# Patient Record
Sex: FEMALE | Race: WHITE | Employment: FULL TIME | URBAN - METROPOLITAN AREA
[De-identification: names, ages, dates, MRNs, and addresses within clinical notes are randomized per-mention and may not be internally consistent; named-entity substitution may affect disease eponyms.]

---

## 2019-05-15 ENCOUNTER — HOSPITAL ENCOUNTER (OUTPATIENT)
Dept: ULTRASOUND IMAGING | Age: 69
Discharge: HOME | End: 2019-05-15
Attending: NURSE PRACTITIONER
Payer: MEDICARE

## 2019-05-15 DIAGNOSIS — M79.89 SWELLING OF LIMB: ICD-10-CM

## 2019-05-15 DIAGNOSIS — M79.605 LEFT LEG PAIN: ICD-10-CM

## 2019-05-15 PROCEDURE — 93971 EXTREMITY STUDY: CPT

## 2021-01-23 ENCOUNTER — NURSE TRIAGE (OUTPATIENT)
Dept: OTHER | Facility: CLINIC | Age: 71
End: 2021-01-23

## 2021-01-23 NOTE — TELEPHONE ENCOUNTER
Call received on [address]. Brief description of triage: covid exposure    Triage indicates for patient to call pcp when office opens. Care advice provided. Recommended using local department of health website for testing locations and up to date information. Caller verbalizes understanding, denies any other questions or concerns; instructed to call back for any new or worsening symptoms. Reason for Disposition   [1] CLOSE CONTACT COVID-19 EXPOSURE within last 14 days AND [2] NO symptoms    Answer Assessment - Initial Assessment Questions  1. COVID-19 CLOSE CONTACT: \"Who is the person with the confirmed or suspected COVID-19 infection that you were exposed to? \"      Son in law on Wednesday    2. PLACE of CONTACT: \"Where were you when you were exposed to COVID-19? \" (e.g., home, school, medical waiting room; which city?)      Home    3. TYPE of CONTACT: \"How much contact was there? \" (e.g., sitting next to, live in same house, work in same office, same building)       Sat together    4. DURATION of CONTACT: \"How long were you in contact with the COVID-19 patient? \" (e.g., a few seconds, passed by person, a few minutes, 15 minutes or longer, live with the patient)       More than 15 minutes    5. MASK: \"Were you wearing a mask? \" \"Was the other person wearing a mask? \" Note: wearing a mask reduces the risk of an otherwise close contact. No masks    6. DATE of CONTACT: \"When did you have contact with a COVID-19 patient? \" (e.g., how many days ago)      Wednesday of last week    7. COMMUNITY SPREAD: \"Are there lots of cases of COVID-19 (community spread) where you live? \" (See public health department website, if unsure)        NA    8. SYMPTOMS: \"Do you have any symptoms? \" (e.g., fever, cough, breathing difficulty, loss of taste or smell)      None    9. PREGNANCY OR POSTPARTUM: \"Is there any chance you are pregnant? \" \"When was your last menstrual period? \" \"Did you deliver in the last 2 weeks? \" NA    10. HIGH RISK: \"Do you have any heart or lung problems? Do you have a weak immune system? \" (e.g., heart failure, COPD, asthma, HIV positive, chemotherapy, renal failure, diabetes mellitus, sickle cell anemia, obesity)        None    11. TRAVEL: \"Have you traveled out of the country recently? \" If so, \"When and where? \" Also ask about out-of-state travel, since the [facility] has identified some high-risk cities for community spread in the  Note: Travel becomes less relevant if there is widespread community transmission where the patient lives.         NA    Protocols used: CORONAVIRUS (COVID-19) EXPOSURE-ADULT-AH

## 2022-04-18 RX ORDER — ROSUVASTATIN CALCIUM 20 MG/1
20 TABLET, COATED ORAL
COMMUNITY

## 2022-04-18 RX ORDER — ZINC GLUCONATE 50 MG
TABLET ORAL
COMMUNITY

## 2022-04-18 RX ORDER — METFORMIN HYDROCHLORIDE 1000 MG/1
1000 TABLET ORAL 2 TIMES DAILY WITH MEALS
COMMUNITY

## 2022-04-18 RX ORDER — ASPIRIN 81 MG/1
81 TABLET ORAL DAILY
COMMUNITY

## 2022-04-18 RX ORDER — FAMOTIDINE 40 MG/1
40 TABLET, FILM COATED ORAL DAILY
COMMUNITY

## 2022-04-18 RX ORDER — CHOLECALCIFEROL (VITAMIN D3) 50 MCG
CAPSULE ORAL
COMMUNITY

## 2022-04-18 RX ORDER — AMLODIPINE BESYLATE 10 MG/1
10 TABLET ORAL DAILY
COMMUNITY

## 2022-04-18 RX ORDER — LOSARTAN POTASSIUM 100 MG/1
100 TABLET ORAL DAILY
COMMUNITY

## 2022-04-20 ENCOUNTER — HOSPITAL ENCOUNTER (OUTPATIENT)
Age: 72
Setting detail: OUTPATIENT SURGERY
Discharge: HOME | End: 2022-04-20
Attending: SPECIALIST | Admitting: SPECIALIST
Payer: MEDICARE

## 2022-04-20 ENCOUNTER — ANESTHESIA (OUTPATIENT)
Dept: ENDOSCOPY | Age: 72
End: 2022-04-20
Payer: MEDICARE

## 2022-04-20 ENCOUNTER — ANESTHESIA EVENT (OUTPATIENT)
Dept: ENDOSCOPY | Age: 72
End: 2022-04-20
Payer: MEDICARE

## 2022-04-20 VITALS
HEIGHT: 69 IN | WEIGHT: 172.9 LBS | SYSTOLIC BLOOD PRESSURE: 129 MMHG | RESPIRATION RATE: 16 BRPM | OXYGEN SATURATION: 100 % | HEART RATE: 60 BPM | TEMPERATURE: 97.2 F | BODY MASS INDEX: 25.61 KG/M2 | DIASTOLIC BLOOD PRESSURE: 48 MMHG

## 2022-04-20 PROCEDURE — 74011250636 HC RX REV CODE- 250/636: Performed by: NURSE ANESTHETIST, CERTIFIED REGISTERED

## 2022-04-20 PROCEDURE — 74011250636 HC RX REV CODE- 250/636: Performed by: SPECIALIST

## 2022-04-20 PROCEDURE — 76040000019: Performed by: SPECIALIST

## 2022-04-20 PROCEDURE — 2709999900 HC NON-CHARGEABLE SUPPLY: Performed by: SPECIALIST

## 2022-04-20 PROCEDURE — 74011000250 HC RX REV CODE- 250: Performed by: NURSE ANESTHETIST, CERTIFIED REGISTERED

## 2022-04-20 PROCEDURE — 76060000031 HC ANESTHESIA FIRST 0.5 HR: Performed by: SPECIALIST

## 2022-04-20 RX ORDER — PROPOFOL 10 MG/ML
INJECTION, EMULSION INTRAVENOUS AS NEEDED
Status: DISCONTINUED | OUTPATIENT
Start: 2022-04-20 | End: 2022-04-20 | Stop reason: HOSPADM

## 2022-04-20 RX ORDER — SODIUM CHLORIDE 0.9 % (FLUSH) 0.9 %
5-40 SYRINGE (ML) INJECTION EVERY 8 HOURS
Status: DISCONTINUED | OUTPATIENT
Start: 2022-04-20 | End: 2022-04-20 | Stop reason: HOSPADM

## 2022-04-20 RX ORDER — DEXTROMETHORPHAN/PSEUDOEPHED 2.5-7.5/.8
1.2 DROPS ORAL
Status: DISCONTINUED | OUTPATIENT
Start: 2022-04-20 | End: 2022-04-20 | Stop reason: HOSPADM

## 2022-04-20 RX ORDER — LIDOCAINE HYDROCHLORIDE 20 MG/ML
INJECTION, SOLUTION EPIDURAL; INFILTRATION; INTRACAUDAL; PERINEURAL AS NEEDED
Status: DISCONTINUED | OUTPATIENT
Start: 2022-04-20 | End: 2022-04-20 | Stop reason: HOSPADM

## 2022-04-20 RX ORDER — SODIUM CHLORIDE 9 MG/ML
50 INJECTION, SOLUTION INTRAVENOUS CONTINUOUS
Status: DISCONTINUED | OUTPATIENT
Start: 2022-04-20 | End: 2022-04-20 | Stop reason: HOSPADM

## 2022-04-20 RX ORDER — SODIUM CHLORIDE 0.9 % (FLUSH) 0.9 %
5-40 SYRINGE (ML) INJECTION AS NEEDED
Status: DISCONTINUED | OUTPATIENT
Start: 2022-04-20 | End: 2022-04-20 | Stop reason: HOSPADM

## 2022-04-20 RX ADMIN — LIDOCAINE HYDROCHLORIDE 40 MG: 20 INJECTION, SOLUTION EPIDURAL; INFILTRATION; INTRACAUDAL; PERINEURAL at 08:30

## 2022-04-20 RX ADMIN — SODIUM CHLORIDE 50 ML/HR: 9 INJECTION, SOLUTION INTRAVENOUS at 07:55

## 2022-04-20 RX ADMIN — PROPOFOL 50 MG: 10 INJECTION, EMULSION INTRAVENOUS at 08:30

## 2022-04-20 RX ADMIN — PROPOFOL 20 MG: 10 INJECTION, EMULSION INTRAVENOUS at 08:38

## 2022-04-20 RX ADMIN — PROPOFOL 50 MG: 10 INJECTION, EMULSION INTRAVENOUS at 08:33

## 2022-04-20 NOTE — DISCHARGE INSTRUCTIONS
Nadia Pato  [id number]  1950    COLON DISCHARGE INSTRUCTIONS  Discomfort:  Redness at IV site- apply warm compress to area; if redness or soreness persist- contact your physician  There may be a slight amount of blood passed from the rectum  Gaseous discomfort- walking, belching will help relieve any discomfort  You may not operate a vehicle for 12 hours  You may not engage in an occupation involving machinery or appliances for rest of today  You may not drink alcoholic beverages for at least 12 hours  Avoid making any critical decisions for at least 24 hour  DIET:   Regular diet. - however -  remember your colon is empty and a heavy meal will produce gas. Avoid these foods:  vegetables, fried / greasy foods, carbonated drinks for today. MEDICATIONS:        Regarding Aspirin or Nonsteroidal medications, please see below. ACTIVITY:  You may resume your normal daily activities it is recommended that you spend the remainder of the day resting -  avoid any strenuous activity. CALL M.D. ANY SIGN OF:  Increasing pain, nausea, vomiting  Abdominal distension (swelling)  New increased bleeding (oral or rectal)  Fever (chills)  Pain in chest area  Bloody discharge from nose or mouth  Shortness of breath     Tylenol as needed for pain.       Follow-up Instructions:   Call Dr. Vicky Talbot for results of procedure at telephone #  [phone]              Repeat Colonoscopy in 10 years

## 2022-04-20 NOTE — ANESTHESIA POSTPROCEDURE EVALUATION
Procedure(s):  COLONOSCOPY. MAC    Anesthesia Post Evaluation      Multimodal analgesia: multimodal analgesia used between 6 hours prior to anesthesia start to PACU discharge  Patient location during evaluation: PACU  Patient participation: complete - patient participated  Level of consciousness: sleepy but conscious  Pain score: 1  Pain management: adequate  Airway patency: patent  Anesthetic complications: no  Cardiovascular status: acceptable  Respiratory status: acceptable  Hydration status: acceptable  Comments: +Post-Anesthesia Evaluation and Assessment    Patient: Troy Vargas MRN: [id number]  SSN: [id number]   YOB: 1950  Age: 67 y.o. Sex: female      Cardiovascular Function/Vital Signs    BP (!) 94/43   Pulse 60   Temp 36.3 °C (97.4 °F)   Resp 15   Ht 5' 9\" (1.753 m)   Wt 78.4 kg (172 lb 14.4 oz)   SpO2 100%   BMI 25.53 kg/m²     Patient is status post Procedure(s):  COLONOSCOPY. Nausea/Vomiting: Controlled. Postoperative hydration reviewed and adequate. Pain:  Pain Scale 1: Numeric (0 - 10) (04/20/22 0847)  Pain Intensity 1: 0 (04/20/22 0847)   Managed. Neurological Status: At baseline. Mental Status and Level of Consciousness: Arousable. Pulmonary Status:   O2 Device: None (Room air) (04/20/22 0847)   Adequate oxygenation and airway patent. Complications related to anesthesia: None    Post-anesthesia assessment completed. No concerns. Signed By: Michal Ganser, MD    4/20/2022  Post anesthesia nausea and vomiting:  controlled  Final Post Anesthesia Temperature Assessment:  Normothermia (36.0-37.5 degrees C)      INITIAL Post-op Vital signs: No vitals data found for the desired time range.

## 2022-04-20 NOTE — ROUTINE PROCESS
[address]  1950  [id number]    Situation:  Verbal report received from: Leo De Anda  Procedure: Procedure(s):  COLONOSCOPY    Background:    Preoperative diagnosis: Screening for colon cancer [Z12.11]  Postoperative diagnosis: Internal Hemorrhoids    :  Dr. Guillermina Jones  Assistant(s): Endoscopy Technician-1: Melchor Campbell  Endoscopy RN-1: Joel Hyatt    Specimens: * No specimens in log *  H. Pylori  no    Assessment:  Intra-procedure medications       Anesthesia gave intra-procedure sedation and medications, see anesthesia flow sheet yes    Intravenous fluids: NS@ KVO     Vital signs stable yes    Abdominal assessment: round and soft yes    Recommendation:

## 2022-04-20 NOTE — ANESTHESIA PREPROCEDURE EVALUATION
Relevant Problems   No relevant active problems       Anesthetic History   No history of anesthetic complications            Review of Systems / Medical History  Patient summary reviewed, nursing notes reviewed and pertinent labs reviewed    Pulmonary  Within defined limits                 Neuro/Psych   Within defined limits           Cardiovascular    Hypertension              Exercise tolerance: >4 METS     GI/Hepatic/Renal     GERD           Endo/Other    Diabetes: type 2         Other Findings              Physical Exam    Airway  Mallampati: II  TM Distance: 4 - 6 cm  Neck ROM: normal range of motion   Mouth opening: Normal     Cardiovascular  Regular rate and rhythm,  S1 and S2 normal,  no murmur, click, rub, or gallop  Rhythm: regular  Rate: normal         Dental  No notable dental hx       Pulmonary  Breath sounds clear to auscultation               Abdominal  GI exam deferred       Other Findings            Anesthetic Plan    ASA: 2  Anesthesia type: MAC          Induction: Intravenous  Anesthetic plan and risks discussed with: Patient

## 2022-04-20 NOTE — PROCEDURES
Colonoscopy Procedure Note    Indications:   Screening colonoscopy    Referring Physician: Mai Austin MD  Anesthesia/Sedation: MAC anesthesia Propofol  Endoscopist:  Dr. Vincent Jimenez    Procedure in Detail:  Informed consent was obtained for the procedure, including sedation. Risks of perforation, hemorrhage, adverse drug reaction, and aspiration were discussed. The patient was placed in the left lateral decubitus position. Based on the pre-procedure assessment, including review of the patient's medical history, medications, allergies, and review of systems, she had been deemed to be an appropriate candidate for moderate sedation; she was therefore sedated with the medications listed above. The patient was monitored continuously with ECG tracing, pulse oximetry, blood pressure monitoring, and direct observations. A rectal examination was performed. The [id number] was inserted into the rectum and advanced under direct vision to the terminal ileum. The quality of the colonic preparation was adequate. A careful inspection was made as the colonoscope was withdrawn, including a retroflexed view of the rectum; findings and interventions are described below. Appropriate photodocumentation was obtained. Findings:   1. Scope advanced to the cecum and terminal ileum. 2.  Preparation was adequate. 3.  No polyps seen. 4.  Small internal hemorrhoids. Therapies:  none    Specimen:  none     Complications: None were encountered during the procedure. EBL: < 10 ml.     Recommendations:   -Repeat Colonoscopy in 10 years    Signed By: Jacinta Hammer MD                        April 20, 2022

## 2022-04-20 NOTE — H&P
Gastroenterology Outpatient History and Physical    Patient: Derick Becerra    Physician: Osbaldo Emery MD    Vital Signs: Blood pressure (!) 132/54, pulse 81, temperature 97.4 °F (36.3 °C), resp. rate 18, height 5' 9\" (1.753 m), weight 78.4 kg (172 lb 14.4 oz), SpO2 100 %. Allergies: Allergies   Allergen Reactions    Lisinopril Cough    Tetracyclines Other (comments)     Unknown-per mother as child       Chief Complaint: CRC screening    History of Present Illness: 66 yo WF for screening colonoscopy    Justification for Procedure: above    History:  Past Medical History:   Diagnosis Date    Diabetes ([address]     GERD (gastroesophageal reflux disease)     Hypertension     Leaky heart valve     [facility]      Past Surgical History:   Procedure Laterality Date    HX CATARACT REMOVAL      HX TUBAL LIGATION        Social History     Socioeconomic History    Marital status:    Tobacco Use    Smoking status: Never Smoker    Smokeless tobacco: Never Used   Vaping Use    Vaping Use: Never used   Substance and Sexual Activity    Alcohol use: Yes     Alcohol/week: 2.0 standard drinks     Types: 2 Glasses of wine per week    Drug use: Never    Sexual activity: Yes     Partners: Male      Family History   Problem Relation Age of Onset    Cancer Mother     Cancer Father        Medications:   Prior to Admission medications    Medication Sig Start Date End Date Taking? Authorizing Provider   metFORMIN (GLUCOPHAGE) 1,000 mg tablet Take 1,000 mg by mouth two (2) times daily (with meals). Yes Provider, Historical   amLODIPine (NORVASC) 10 mg tablet Take 10 mg by mouth daily. Yes Provider, Historical   rosuvastatin (CRESTOR) 20 mg tablet Take 20 mg by mouth nightly. Yes Provider, Historical   losartan (COZAAR) 100 mg tablet Take 100 mg by mouth daily. Yes Provider, Historical   famotidine (PEPCID) 40 mg tablet Take 40 mg by mouth daily.    Yes Provider, Historical B.animalis,bifid,infantis,long (Probiotic 4X) 10-15 mg TbEC Take  by mouth. Yes Provider, Historical   Magnesium Oxide 500 mg cap Take  by mouth. Yes Provider, Historical   aspirin delayed-release 81 mg tablet Take 81 mg by mouth daily. Yes Provider, Historical       Physical Exam:   General: alert, no distress   HEENT: Head: Normocephalic, no lesions, without obvious abnormality.    Heart: regular rate and rhythm, S1, S2 normal, no murmur, click, rub or gallop   Lungs: chest clear, no wheezing, rales, normal symmetric air entry   Abdominal: soft, NT/ND + BS   Neurological: Grossly normal   Extremities: extremities normal, atraumatic, no cyanosis or edema     Findings/Diagnosis: CRC Screening    Plan of Care/Planned Procedure: Colonoscopy

## 2022-10-11 ENCOUNTER — APPOINTMENT (OUTPATIENT)
Dept: GENERAL RADIOLOGY | Age: 72
End: 2022-10-11
Attending: EMERGENCY MEDICINE
Payer: MEDICARE

## 2022-10-11 ENCOUNTER — HOSPITAL ENCOUNTER (EMERGENCY)
Age: 72
Discharge: HOME | End: 2022-10-11
Attending: EMERGENCY MEDICINE
Payer: MEDICARE

## 2022-10-11 VITALS
HEART RATE: 83 BPM | HEIGHT: 70 IN | OXYGEN SATURATION: 99 % | TEMPERATURE: 98.1 F | WEIGHT: 193.56 LBS | RESPIRATION RATE: 16 BRPM | SYSTOLIC BLOOD PRESSURE: 125 MMHG | DIASTOLIC BLOOD PRESSURE: 65 MMHG | BODY MASS INDEX: 27.71 KG/M2

## 2022-10-11 DIAGNOSIS — R07.9 CHEST PAIN, UNSPECIFIED TYPE: Primary | ICD-10-CM

## 2022-10-11 LAB
ALBUMIN SERPL-MCNC: 4.3 G/DL (ref 3.5–5)
ALBUMIN/GLOB SERPL: 1.3 {RATIO} (ref 1.1–2.2)
ALP SERPL-CCNC: 62 U/L (ref 45–117)
ALT SERPL-CCNC: 32 U/L (ref 12–78)
ANION GAP SERPL CALC-SCNC: 5 MMOL/L (ref 5–15)
AST SERPL-CCNC: 24 U/L (ref 15–37)
ATRIAL RATE: 77 BPM
BASOPHILS # BLD: 0 K/UL (ref 0–0.1)
BASOPHILS NFR BLD: 1 % (ref 0–1)
BILIRUB SERPL-MCNC: 0.6 MG/DL (ref 0.2–1)
BNP SERPL-MCNC: 88 PG/ML
BUN SERPL-MCNC: 18 MG/DL (ref 6–20)
BUN/CREAT SERPL: 19 (ref 12–20)
CALCIUM SERPL-MCNC: 9.5 MG/DL (ref 8.5–10.1)
CALCULATED P AXIS, ECG09: 70 DEGREES
CALCULATED R AXIS, ECG10: -10 DEGREES
CALCULATED T AXIS, ECG11: 34 DEGREES
CHLORIDE SERPL-SCNC: 105 MMOL/L (ref 97–108)
CO2 SERPL-SCNC: 27 MMOL/L (ref 21–32)
CREAT SERPL-MCNC: 0.97 MG/DL (ref 0.55–1.02)
DIAGNOSIS, 93000: NORMAL
DIFFERENTIAL METHOD BLD: NORMAL
EOSINOPHIL # BLD: 0.1 K/UL (ref 0–0.4)
EOSINOPHIL NFR BLD: 2 % (ref 0–7)
ERYTHROCYTE [DISTWIDTH] IN BLOOD BY AUTOMATED COUNT: 12.6 % (ref 11.5–14.5)
GLOBULIN SER CALC-MCNC: 3.2 G/DL (ref 2–4)
GLUCOSE SERPL-MCNC: 145 MG/DL (ref 65–100)
HCT VFR BLD AUTO: 39.7 % (ref 35–47)
HGB BLD-MCNC: 12.9 G/DL (ref 11.5–16)
IMM GRANULOCYTES # BLD AUTO: 0 K/UL (ref 0–0.04)
IMM GRANULOCYTES NFR BLD AUTO: 0 % (ref 0–0.5)
LYMPHOCYTES # BLD: 1.8 K/UL (ref 0.8–3.5)
LYMPHOCYTES NFR BLD: 24 % (ref 12–49)
MCH RBC QN AUTO: 30.5 PG (ref 26–34)
MCHC RBC AUTO-ENTMCNC: 32.5 G/DL (ref 30–36.5)
MCV RBC AUTO: 93.9 FL (ref 80–99)
MONOCYTES # BLD: 0.5 K/UL (ref 0–1)
MONOCYTES NFR BLD: 7 % (ref 5–13)
NEUTS SEG # BLD: 5.1 K/UL (ref 1.8–8)
NEUTS SEG NFR BLD: 66 % (ref 32–75)
NRBC # BLD: 0 K/UL (ref 0–0.01)
NRBC BLD-RTO: 0 PER 100 WBC
P-R INTERVAL, ECG05: 156 MS
PLATELET # BLD AUTO: 185 K/UL (ref 150–400)
PMV BLD AUTO: 10 FL (ref 8.9–12.9)
POTASSIUM SERPL-SCNC: 3.9 MMOL/L (ref 3.5–5.1)
PROT SERPL-MCNC: 7.5 G/DL (ref 6.4–8.2)
Q-T INTERVAL, ECG07: 390 MS
QRS DURATION, ECG06: 88 MS
QTC CALCULATION (BEZET), ECG08: 441 MS
RBC # BLD AUTO: 4.23 M/UL (ref 3.8–5.2)
SODIUM SERPL-SCNC: 137 MMOL/L (ref 136–145)
TROPONIN-HIGH SENSITIVITY: 5 NG/L (ref 0–51)
TROPONIN-HIGH SENSITIVITY: 5 NG/L (ref 0–51)
VENTRICULAR RATE, ECG03: 77 BPM
WBC # BLD AUTO: 7.6 K/UL (ref 3.6–11)

## 2022-10-11 PROCEDURE — 84484 ASSAY OF TROPONIN QUANT: CPT

## 2022-10-11 PROCEDURE — 36415 COLL VENOUS BLD VENIPUNCTURE: CPT

## 2022-10-11 PROCEDURE — 83880 ASSAY OF NATRIURETIC PEPTIDE: CPT

## 2022-10-11 PROCEDURE — 85025 COMPLETE CBC W/AUTO DIFF WBC: CPT

## 2022-10-11 PROCEDURE — 93005 ELECTROCARDIOGRAM TRACING: CPT

## 2022-10-11 PROCEDURE — 74011250637 HC RX REV CODE- 250/637: Performed by: STUDENT IN AN ORGANIZED HEALTH CARE EDUCATION/TRAINING PROGRAM

## 2022-10-11 PROCEDURE — 80053 COMPREHEN METABOLIC PANEL: CPT

## 2022-10-11 PROCEDURE — 71046 X-RAY EXAM CHEST 2 VIEWS: CPT

## 2022-10-11 PROCEDURE — 99285 EMERGENCY DEPT VISIT HI MDM: CPT

## 2022-10-11 RX ORDER — GUAIFENESIN 100 MG/5ML
162 LIQUID (ML) ORAL
Status: DISCONTINUED | OUTPATIENT
Start: 2022-10-11 | End: 2022-10-11

## 2022-10-11 RX ORDER — ASPIRIN 325 MG
325 TABLET ORAL
Status: COMPLETED | OUTPATIENT
Start: 2022-10-11 | End: 2022-10-11

## 2022-10-11 RX ADMIN — ASPIRIN 325 MG ORAL TABLET 325 MG: 325 PILL ORAL at 11:08

## 2022-10-11 NOTE — ED PROVIDER NOTES
EMERGENCY DEPARTMENT HISTORY AND PHYSICAL EXAM          Date: 10/11/2022  Patient Name: Katey Santana    History of Presenting Illness     Chief Complaint   Patient presents with    Chest Pain     C/o left sided chest pain that radiates to left arm while exercising this morning; denies any SOB, tingling and or numbness; last saw cardiology in August; hx of Aortic stenosis. History Provided By: Patient    HPI: Katey Santana is a 67 y.o. female, pmhx pretension, diabetes, aortic stenosis on baby aspirin, who presents ambulatory to the ED c/o chest pain that started around 6AM this morning. Patient states that she was riding her bike when she began to notice chest pain that went to her left arm, describes it as an ache, denies ever having had pain like this before. States that she stopped and went to rest, but states that it did not get any better. Denies any shortness of breath, nausea, vomiting, back pain. Denies any recent illness or any other symptoms. Patient notes that she takes baby aspirin at night, has not taken it yet today. PCP: Marcie Martinez MD    Allergies: Lisinopril, tetracyclines  Social Hx: No tobacco, alcohol, or drug use. There are no other complaints, changes, or physical findings at this time. Current Outpatient Medications   Medication Sig Dispense Refill    metFORMIN (GLUCOPHAGE) 1,000 mg tablet Take 1,000 mg by mouth two (2) times daily (with meals). amLODIPine (NORVASC) 10 mg tablet Take 10 mg by mouth daily. rosuvastatin (CRESTOR) 20 mg tablet Take 20 mg by mouth nightly. losartan (COZAAR) 100 mg tablet Take 100 mg by mouth daily. famotidine (PEPCID) 40 mg tablet Take 40 mg by mouth daily. B.animalis,bifid,infantis,long (Probiotic 4X) 10-15 mg TbEC Take  by mouth. Magnesium Oxide 500 mg cap Take  by mouth. aspirin delayed-release 81 mg tablet Take 81 mg by mouth daily.          Past History     Past Medical History:  Past Medical History:   Diagnosis Date    Diabetes ([address]     GERD (gastroesophageal reflux disease)     Hypertension     Leaky heart valve     [facility]-VCS       Past Surgical History:  Past Surgical History:   Procedure Laterality Date    COLONOSCOPY N/A 4/20/2022    COLONOSCOPY performed by Heron Hernandez MD at [facility]    HX CATARACT REMOVAL      HX TUBAL LIGATION         Family History:  Family History   Problem Relation Age of Onset    Cancer Mother     Cancer Father        Social History:  Social History     Tobacco Use    Smoking status: Never    Smokeless tobacco: Never   Vaping Use    Vaping Use: Never used   Substance Use Topics    Alcohol use: Yes     Alcohol/week: 2.0 standard drinks     Types: 2 Glasses of wine per week    Drug use: Never       Allergies: Allergies   Allergen Reactions    Lisinopril Cough    Tetracyclines Other (comments)     Unknown-per mother as child         Review of Systems   Review of Systems   Constitutional:  Negative for activity change, appetite change, chills, fever and unexpected weight change. HENT:  Negative for congestion. Eyes:  Negative for pain and visual disturbance. Respiratory:  Negative for cough and shortness of breath. Cardiovascular:  Positive for chest pain. Gastrointestinal:  Negative for abdominal pain, diarrhea, nausea and vomiting. Genitourinary:  Negative for dysuria. Musculoskeletal:  Negative for back pain. Skin:  Negative for rash. Neurological:  Negative for headaches. Physical Exam   Physical Exam  Constitutional:       General: She is not in acute distress. Appearance: She is well-developed. She is not toxic-appearing or diaphoretic. HENT:      Right Ear: External ear normal.      Left Ear: External ear normal.      Nose: Nose normal.   Eyes:      General:         Right eye: No discharge. Left eye: No discharge. Cardiovascular:      Rate and Rhythm: Normal rate and regular rhythm. Comments: 3/6 systolic murmur  Pulmonary:      Effort: Pulmonary effort is normal.      Breath sounds: Normal breath sounds. Abdominal:      General: Abdomen is flat. Palpations: Abdomen is soft. Tenderness: no abdominal tenderness There is no guarding. Musculoskeletal:         General: Normal range of motion. Right lower leg: No edema. Left lower leg: No edema. Skin:     General: Skin is warm and dry. Neurological:      General: No focal deficit present. Mental Status: She is alert. Mental status is at baseline. Diagnostic Study Results     Labs -     Recent Results (from the past 12 hour(s))   EKG, 12 LEAD, INITIAL    Collection Time: 10/11/22 10:27 AM   Result Value Ref Range    Ventricular Rate 77 BPM    Atrial Rate 77 BPM    P-R Interval 156 ms    QRS Duration 88 ms    Q-T Interval 390 ms    QTC Calculation (Bezet) 441 ms    Calculated P Axis 70 degrees    Calculated R Axis -10 degrees    Calculated T Axis 34 degrees    Diagnosis       Sinus rhythm with premature supraventricular complexes  Septal infarct , age undetermined  No previous ECGs available     CBC WITH AUTOMATED DIFF    Collection Time: 10/11/22 10:29 AM   Result Value Ref Range    WBC 7.6 3.6 - 11.0 K/uL    RBC 4.23 3.80 - 5.20 M/uL    HGB 12.9 11.5 - 16.0 g/dL    HCT 39.7 35.0 - 47.0 %    MCV 93.9 80.0 - 99.0 FL    MCH 30.5 26.0 - 34.0 PG    MCHC 32.5 30.0 - 36.5 g/dL    RDW 12.6 11.5 - 14.5 %    PLATELET 441 309 - 727 K/uL    MPV 10.0 8.9 - 12.9 FL    NRBC 0.0 0  WBC    ABSOLUTE NRBC 0.00 0.00 - 0.01 K/uL    NEUTROPHILS 66 32 - 75 %    LYMPHOCYTES 24 12 - 49 %    MONOCYTES 7 5 - 13 %    EOSINOPHILS 2 0 - 7 %    BASOPHILS 1 0 - 1 %    IMMATURE GRANULOCYTES 0 0.0 - 0.5 %    ABS. NEUTROPHILS 5.1 1.8 - 8.0 K/UL    ABS. LYMPHOCYTES 1.8 0.8 - 3.5 K/UL    ABS. MONOCYTES 0.5 0.0 - 1.0 K/UL    ABS. EOSINOPHILS 0.1 0.0 - 0.4 K/UL    ABS. BASOPHILS 0.0 0.0 - 0.1 K/UL    ABS. IMM.  GRANS. 0.0 0.00 - 0.04 K/UL DF AUTOMATED     METABOLIC PANEL, COMPREHENSIVE    Collection Time: 10/11/22 10:29 AM   Result Value Ref Range    Sodium 137 136 - 145 mmol/L    Potassium 3.9 3.5 - 5.1 mmol/L    Chloride 105 97 - 108 mmol/L    CO2 27 21 - 32 mmol/L    Anion gap 5 5 - 15 mmol/L    Glucose 145 (H) 65 - 100 mg/dL    BUN 18 6 - 20 MG/DL    Creatinine 0.97 0.55 - 1.02 MG/DL    BUN/Creatinine ratio 19 12 - 20      eGFR >60 >60 ml/min/1.73m2    Calcium 9.5 8.5 - 10.1 MG/DL    Bilirubin, total 0.6 0.2 - 1.0 MG/DL    ALT (SGPT) 32 12 - 78 U/L    AST (SGOT) 24 15 - 37 U/L    Alk. phosphatase 62 45 - 117 U/L    Protein, total 7.5 6.4 - 8.2 g/dL    Albumin 4.3 3.5 - 5.0 g/dL    Globulin 3.2 2.0 - 4.0 g/dL    A-G Ratio 1.3 1.1 - 2.2     NT-PRO BNP    Collection Time: 10/11/22 10:29 AM   Result Value Ref Range    NT pro-BNP 88 <125 PG/ML   TROPONIN-HIGH SENSITIVITY    Collection Time: 10/11/22 10:29 AM   Result Value Ref Range    Troponin-High Sensitivity 5 0 - 51 ng/L   TROPONIN-HIGH SENSITIVITY    Collection Time: 10/11/22 12:08 PM   Result Value Ref Range    Troponin-High Sensitivity 5 0 - 51 ng/L       Radiologic Studies -   XR CHEST PA LAT   Final Result   No acute cardiopulmonary disease. CT Results  (Last 48 hours)      None          CXR Results  (Last 48 hours)                 10/11/22 1041  XR CHEST PA LAT Final result    Impression:  No acute cardiopulmonary disease. Narrative: Indication: Chest pain. Exam: PA and lateral views of the chest.       There is no prior study for direct comparison. Findings: Cardiomediastinal silhouette is within normal limits. Lungs are clear   bilaterally. Pleural spaces are normal. Osseous structures are intact. Medical Decision Making   I am the first provider for this patient. I reviewed the vital signs, available nursing notes, past medical history, past surgical history, family history and social history.     Vital Signs-Reviewed the patient's vital signs. Patient Vitals for the past 12 hrs:   Temp Pulse Resp BP SpO2   10/11/22 1130 -- -- -- 125/65 --   10/11/22 1020 98.1 °F (36.7 °C) 83 16 (!) 143/89 99 %         Records Reviewed: Nursing Notes and Old Medical Records    Provider Notes (Medical Decision Making):   MDM: Igor Holley is a 67 y.o. female, pmhx pretension, diabetes, aortic stenosis on baby aspirin, who presents ambulatory to the ED c/o chest pain that started around 6AM this morning. Patient hemodynamically stable, without any signs of STEMI on EKG. Chest pain work-up has been ordered, including BNP and troponin. Patient without any shortness of breath, leg edema, lungs are clear, do not believe fulminant heart failure is a likely cause of pain. Patient follows with [facility] for aortic stenosis, will call regarding further evaluation and treatment as patient may benefit from further inpatient workup and stress test v. Outpatient stress. ED Course:   Initial assessment performed. The patients presenting problems have been discussed, and they are in agreement with the care plan formulated and outlined with them. I have encouraged them to ask questions as they arise throughout their visit. EKG interpretation: (Preliminary)  Rhythm: This EKG was interpreted by ED Provider Luz Topete MD      ED Course as of 10/11/22 1311   Tue Oct 11, 2022   1124 Troponin within normal range. Bnp within normal range. CBC/CMP reviewed, values grossly WNL. Cardiology has been paged, will review case with them. [JW]   [phone] to cardiology, who states that if patient's second troponin is negative, recommend follow up in office. [JW]   1302 TROPONIN-HIGH SENSITIVITY:    Troponin-High Sensitivity 5  Rpt troponin remains negative. Pt with 2/10 pain, declines any pain medicine, including Tylenol or GI cocktail at this time. Feels comfortable going home. Will plan on sending patient home with cardiology follow up.  Patient understands that she is to come back to the ED if she has worsening pain or any other concerns, return precautions discussed, patient had the chance to ask questions, agreeable with plan. [JW]      ED Course User Index  [JW] Rina Ruby MD          Diagnosis     Clinical Impression:   1. Chest pain, unspecified type        PLAN:  1. Current Discharge Medication List        2. Follow-up Information       Follow up With Specialties Details Why [address] [facility]  Schedule an appointment as soon as possible for a visit   [address]  [place] [address] [facility] [address]    [facility] EMERGENCY DEPT Emergency Medicine  As needed, If symptoms worsen [address]  [address] [facility]  [phone]          Return to ED if worse     Disposition:  Home       Please note, this dictation was completed with [employer], the computer voice recognition software. Quite often unanticipated grammatical, syntax, homophones, and other interpretive errors are inadvertently transcribed by the computer software. Please disregard these errors. Please excuse any errors that have escaped final proof reading.

## 2022-10-11 NOTE — DISCHARGE INSTRUCTIONS
You were seen in the emergency department for chest pain. You had an EKG that did not show signs of a heart attack. You had blood work, including 2 troponins, which were normal, meaning that you are not having a heart attack. Please call your cardiologist today to make an appointment as soon as possible. Please return to the emergency department if you have any new or worsening pain or if you have any other concerns.

## 2023-08-21 ENCOUNTER — HOSPITAL ENCOUNTER (OUTPATIENT)
Facility: HOSPITAL | Age: 73
Discharge: HOME | End: 2023-08-24
Payer: MEDICARE

## 2023-08-21 DIAGNOSIS — M25.561 RIGHT KNEE PAIN, UNSPECIFIED CHRONICITY: ICD-10-CM

## 2023-08-21 PROCEDURE — 73562 X-RAY EXAM OF KNEE 3: CPT

## 2023-12-08 ENCOUNTER — HOSPITAL ENCOUNTER (OUTPATIENT)
Facility: HOSPITAL | Age: 73
End: 2023-12-08
Payer: MEDICARE

## 2023-12-08 ENCOUNTER — TRANSCRIBE ORDERS (OUTPATIENT)
Facility: HOSPITAL | Age: 73
End: 2023-12-08

## 2023-12-08 DIAGNOSIS — I35.0 AORTIC VALVE STENOSIS, ETIOLOGY OF CARDIAC VALVE DISEASE UNSPECIFIED: ICD-10-CM

## 2023-12-08 DIAGNOSIS — R93.1 ABNORMAL CT SCAN OF HEART: Primary | ICD-10-CM

## 2023-12-08 DIAGNOSIS — R93.1 ABNORMAL CT SCAN OF HEART: ICD-10-CM

## 2023-12-08 PROCEDURE — 71046 X-RAY EXAM CHEST 2 VIEWS: CPT

## 2023-12-15 ENCOUNTER — HOSPITAL ENCOUNTER (OUTPATIENT)
Facility: HOSPITAL | Age: 73
Discharge: HOME | End: 2023-12-15
Attending: INTERNAL MEDICINE | Admitting: INTERNAL MEDICINE
Payer: MEDICARE

## 2023-12-15 ENCOUNTER — TELEPHONE (OUTPATIENT)
Age: 73
End: 2023-12-15

## 2023-12-15 VITALS
WEIGHT: 173 LBS | HEIGHT: 70 IN | BODY MASS INDEX: 24.77 KG/M2 | HEART RATE: 66 BPM | TEMPERATURE: 97.5 F | SYSTOLIC BLOOD PRESSURE: 127 MMHG | OXYGEN SATURATION: 100 % | DIASTOLIC BLOOD PRESSURE: 80 MMHG | RESPIRATION RATE: 13 BRPM

## 2023-12-15 DIAGNOSIS — I35.0 NODULAR CALCIFIC AORTIC VALVE STENOSIS: ICD-10-CM

## 2023-12-15 DIAGNOSIS — R93.1 ABNORMAL ECHOCARDIOGRAM: ICD-10-CM

## 2023-12-15 LAB
ECHO BSA: 1.96 M2
GLUCOSE BLD STRIP.AUTO-MCNC: 140 MG/DL (ref 65–117)
SERVICE CMNT-IMP: ABNORMAL

## 2023-12-15 PROCEDURE — 2500000003 HC RX 250 WO HCPCS: Performed by: INTERNAL MEDICINE

## 2023-12-15 PROCEDURE — 2709999900 HC NON-CHARGEABLE SUPPLY: Performed by: INTERNAL MEDICINE

## 2023-12-15 PROCEDURE — 6360000002 HC RX W HCPCS: Performed by: INTERNAL MEDICINE

## 2023-12-15 PROCEDURE — 82962 GLUCOSE BLOOD TEST: CPT

## 2023-12-15 PROCEDURE — C1769 GUIDE WIRE: HCPCS | Performed by: INTERNAL MEDICINE

## 2023-12-15 PROCEDURE — 6360000004 HC RX CONTRAST MEDICATION: Performed by: INTERNAL MEDICINE

## 2023-12-15 PROCEDURE — C1894 INTRO/SHEATH, NON-LASER: HCPCS | Performed by: INTERNAL MEDICINE

## 2023-12-15 RX ORDER — SODIUM CHLORIDE 0.9 % (FLUSH) 0.9 %
5-40 SYRINGE (ML) INJECTION EVERY 12 HOURS SCHEDULED
Status: DISCONTINUED | OUTPATIENT
Start: 2023-12-15 | End: 2023-12-15 | Stop reason: HOSPADM

## 2023-12-15 RX ORDER — ONDANSETRON 2 MG/ML
4 INJECTION INTRAMUSCULAR; INTRAVENOUS EVERY 6 HOURS PRN
Status: DISCONTINUED | OUTPATIENT
Start: 2023-12-15 | End: 2023-12-15 | Stop reason: HOSPADM

## 2023-12-15 RX ORDER — ACETAMINOPHEN 325 MG/1
650 TABLET ORAL EVERY 4 HOURS PRN
Status: DISCONTINUED | OUTPATIENT
Start: 2023-12-15 | End: 2023-12-15 | Stop reason: HOSPADM

## 2023-12-15 RX ORDER — HEPARIN SODIUM 10000 [USP'U]/ML
INJECTION, SOLUTION INTRAVENOUS; SUBCUTANEOUS PRN
Status: DISCONTINUED | OUTPATIENT
Start: 2023-12-15 | End: 2023-12-15 | Stop reason: HOSPADM

## 2023-12-15 RX ORDER — LIDOCAINE HYDROCHLORIDE 10 MG/ML
INJECTION, SOLUTION INFILTRATION; PERINEURAL PRN
Status: DISCONTINUED | OUTPATIENT
Start: 2023-12-15 | End: 2023-12-15 | Stop reason: HOSPADM

## 2023-12-15 RX ORDER — VERAPAMIL HYDROCHLORIDE 2.5 MG/ML
INJECTION, SOLUTION INTRAVENOUS PRN
Status: DISCONTINUED | OUTPATIENT
Start: 2023-12-15 | End: 2023-12-15 | Stop reason: HOSPADM

## 2023-12-15 RX ORDER — HEPARIN SODIUM 1000 [USP'U]/ML
INJECTION, SOLUTION INTRAVENOUS; SUBCUTANEOUS PRN
Status: DISCONTINUED | OUTPATIENT
Start: 2023-12-15 | End: 2023-12-15 | Stop reason: HOSPADM

## 2023-12-15 RX ORDER — SODIUM CHLORIDE 9 MG/ML
INJECTION, SOLUTION INTRAVENOUS CONTINUOUS
Status: DISCONTINUED | OUTPATIENT
Start: 2023-12-15 | End: 2023-12-15 | Stop reason: HOSPADM

## 2023-12-15 RX ORDER — FENTANYL CITRATE 50 UG/ML
INJECTION, SOLUTION INTRAMUSCULAR; INTRAVENOUS PRN
Status: DISCONTINUED | OUTPATIENT
Start: 2023-12-15 | End: 2023-12-15 | Stop reason: HOSPADM

## 2023-12-15 NOTE — PROGRESS NOTES
Cardiac Cath Lab Recovery Arrival Note:      1411 Michelle Jennings arrived to Cardiac Cath Lab, Recovery Area. Staff introduced to patient. Patient identifiers verified with NAME and DATE OF BIRTH. Procedure verified with patient. Consent forms reviewed and signed by patient or authorized representative and verified. Allergies verified. Patient and family oriented to department. Patient and family informed of procedure and plan of care. Questions answered with review. Patient prepped for procedure, per orders from physician, prior to arrival.    Patient on cardiac monitor, non-invasive blood pressure, SPO2 monitor. On RA. Patient is A&Ox 4. Patient reports no pain. Patient in stretcher, in low position, with side rails up, call bell within reach, patient instructed to call if assistance as needed. Patient prep in: [address] [address]. Family in: [place], .    Prep by: Lexi Quintero

## 2023-12-15 NOTE — PROGRESS NOTES
Patent walked the yin of recovery area. No signs or symptoms of SOB or distress. I have reviewed discharge instructions with the patient. The patient verbalized understanding. Discharge medications reviewed with patient and appropriate educational materials regarding medications and side effects teaching were provided. Site care instructions reviewed with patient. Pain management teaching completed. Patient instructed to make follow up appointments per discharge instructions. Patient belongings packed up and accounted for with patient and family. All patient belongings sent home with patient. Telemetry monitor and wires removed      Patient signed discharge instructions after reviewing them, and duplicate copy placed in chart.

## 2023-12-15 NOTE — TELEPHONE ENCOUNTER
Call placed to [facility] regarding her upcoming appointment. She said she received the intake paperwork and is \"almost finished\" completing it. She said she has no questions and intends to complete it before her appointment Monday.

## 2023-12-15 NOTE — DISCHARGE INSTRUCTIONS
[address], [address]   [phone]  [place] [address] [address]    [email or web address]    Patient Discharge Instructions    Thomas Naylor / [id number] : 1950    Admitted 12/15/2023 Discharged: 12/15/2023       It is important that you take the medication exactly as they are prescribed. Keep your medication in the bottles provided by the pharmacist and keep a list of the medication names, dosages, and times to be taken in your wallet. Do not take other medications without consulting your doctor. BRING ALL OF YOUR MEDICINES TO YOUR OFFICE VISIT. Follow-up with Dr. Jayden Hopper to schedule TAVR. Cardiac Catheterization  Discharge Instructions    Do not drive, operate any machinery, or sign any legal documents for 24 hours after your procedure. You must have someone to drive you home. You may take a shower 24 hours after your cardiac catheterization. Be sure to get the dressing wet and then remove it; gently wash the area with warm soapy water. Pat dry and leave open to air. To help prevent infections, be sure to keep the cath site clean and dry. No lotions, creams, powders, ointments, etc. in the cath site for approximately 1 week. Do not take a tub bath, get in a hot tub or swimming pool for approximately 5 days or until the cath site is completely healed. No strenuous activity or heavy lifting over 10 lbs. for 7 days. Drink plenty of fluids for 24-48 hours after your cath to flush the contrast dye from your kidneys. No alcoholic beverages for 24 hours. You may resume your previous diet (low fat, low cholesterol) after your cath. After your cath, some bruising or discomfort is common during the healing process. Tylenol, 1-2 tablets every 6 hours as needed, is recommended if you experience any discomfort.   If you experience any signs or symptoms of infection such as fever, chills, or poorly healing incision, persistent tenderness or swelling in the

## 2023-12-15 NOTE — H&P
Admission    NAME: Geena Sellers   :  1950   MRN:  [id number]     Date/Time:  12/15/2023 9:21 AM    Patient PCP: Samina Whitmore MD  ________________________________________________________________________     Assessment:     Severe AS    Please see office note. Plan:     Cath all r/b/a reviewed          [x]        High complexity decision making was performed        Subjective:   CHIEF COMPLAINT: Dyspnea, fatigue    HISTORY OF PRESENT ILLNESS:     Ivana Roy is a 68 y.o.  female who notes progressive dyspnea, fatigue. No chest pain        We were asked to admit for work up and evaluation of the above problems. Past Medical History:   Diagnosis Date    Diabetes ([address])     GERD (gastroesophageal reflux disease)     Hypertension     Leaky heart valve     Ly-VCS      Past Surgical History:   Procedure Laterality Date    CATARACT REMOVAL      COLONOSCOPY N/A 2022    COLONOSCOPY performed by Janett Brady MD at [facility] ENDOSCOPY    TUBAL LIGATION       Allergies   Allergen Reactions    Lisinopril Cough    Tetracyclines & Related Other (See Comments)     Unknown-per mother as child      Meds:  See below  Social History     Tobacco Use    Smoking status: Never    Smokeless tobacco: Never   Substance Use Topics    Alcohol use: Yes     Alcohol/week: 2.0 standard drinks of alcohol      Family History   Problem Relation Age of Onset    Cancer Mother     Cancer Father        REVIEW OF SYSTEMS:     []         Unable to obtain  ROS due to ---   [x]         Total of 12 systems reviewed as follows:                             Total of 12 systems reviewed as follows:       POSITIVE= Bold text  Negative = normal text  General:  fever, chills, sweats, generalized weakness, weight loss/gain,      loss of appetite   Eyes:    blurred vision, eye pain, loss of vision, double vision  ENT:    rhinorrhea, pharyngitis   Respiratory:   cough, sputum production, SOB, RICK, wheezing, pleuritic

## 2023-12-15 NOTE — CARDIO/PULMONARY
Chart reviewed: Patient is 68 y.o. female admitted with Abnormal echocardiogram [R93.1]  Nodular calcific aortic valve stenosis [I35.0]    Cardiac cath 12/15/23 without intervention,  Continue TAVR w/u.     Sadiq Bedolla RN

## 2023-12-29 ENCOUNTER — PREP FOR PROCEDURE (OUTPATIENT)
Age: 73
End: 2023-12-29

## 2023-12-29 RX ORDER — SODIUM CHLORIDE 0.9 % (FLUSH) 0.9 %
5-40 SYRINGE (ML) INJECTION EVERY 12 HOURS SCHEDULED
Status: CANCELLED | OUTPATIENT
Start: 2023-12-29

## 2023-12-29 RX ORDER — SODIUM CHLORIDE 9 MG/ML
INJECTION, SOLUTION INTRAVENOUS PRN
Status: CANCELLED | OUTPATIENT
Start: 2023-12-29

## 2023-12-29 RX ORDER — SODIUM CHLORIDE 0.9 % (FLUSH) 0.9 %
5-40 SYRINGE (ML) INJECTION PRN
Status: CANCELLED | OUTPATIENT
Start: 2023-12-29

## 2024-01-05 ENCOUNTER — PREP FOR PROCEDURE (OUTPATIENT)
Age: 74
End: 2024-01-05

## 2024-01-11 ENCOUNTER — HOSPITAL ENCOUNTER (OUTPATIENT)
Facility: HOSPITAL | Age: 74
Discharge: HOME | End: 2024-01-11
Payer: MEDICARE

## 2024-01-11 VITALS
WEIGHT: 176.81 LBS | TEMPERATURE: 97.7 F | DIASTOLIC BLOOD PRESSURE: 52 MMHG | HEIGHT: 69 IN | RESPIRATION RATE: 16 BRPM | HEART RATE: 63 BPM | BODY MASS INDEX: 26.19 KG/M2 | OXYGEN SATURATION: 100 % | SYSTOLIC BLOOD PRESSURE: 119 MMHG

## 2024-01-11 LAB
ABO + RH BLD: NORMAL
ALBUMIN SERPL-MCNC: 4 G/DL (ref 3.5–5)
ALBUMIN/GLOB SERPL: 1.5 (ref 1.1–2.2)
ALP SERPL-CCNC: 50 U/L (ref 45–117)
ALT SERPL-CCNC: 23 U/L (ref 12–78)
ANION GAP SERPL CALC-SCNC: 5 MMOL/L (ref 5–15)
ANTIGENS PRESENT BLD: NORMAL
AST SERPL-CCNC: 15 U/L (ref 15–37)
BILIRUB SERPL-MCNC: 0.5 MG/DL (ref 0.2–1)
BLOOD GROUP ANTIBODIES SERPL: NORMAL
BLOOD GROUP ANTIBODIES SERPL: NORMAL
BUN SERPL-MCNC: 18 MG/DL (ref 6–20)
BUN/CREAT SERPL: 22 (ref 12–20)
CALCIUM SERPL-MCNC: 9.3 MG/DL (ref 8.5–10.1)
CHLORIDE SERPL-SCNC: 109 MMOL/L (ref 97–108)
CO2 SERPL-SCNC: 26 MMOL/L (ref 21–32)
CREAT SERPL-MCNC: 0.83 MG/DL (ref 0.55–1.02)
ERYTHROCYTE [DISTWIDTH] IN BLOOD BY AUTOMATED COUNT: 13.1 % (ref 11.5–14.5)
EST. AVERAGE GLUCOSE BLD GHB EST-MCNC: 137 MG/DL
GLOBULIN SER CALC-MCNC: 2.7 G/DL (ref 2–4)
GLUCOSE SERPL-MCNC: 128 MG/DL (ref 65–100)
HBA1C MFR BLD: 6.4 % (ref 4–5.6)
HCT VFR BLD AUTO: 38.8 % (ref 35–47)
HGB BLD-MCNC: 12.4 G/DL (ref 11.5–16)
HISTORY CHECK: NORMAL
INR PPP: 1 (ref 0.9–1.1)
MAGNESIUM SERPL-MCNC: 2 MG/DL (ref 1.6–2.4)
MCH RBC QN AUTO: 30.2 PG (ref 26–34)
MCHC RBC AUTO-ENTMCNC: 32 G/DL (ref 30–36.5)
MCV RBC AUTO: 94.6 FL (ref 80–99)
NRBC # BLD: 0 K/UL (ref 0–0.01)
NRBC BLD-RTO: 0 PER 100 WBC
NT PRO BNP: 245 PG/ML
PLATELET # BLD AUTO: 156 K/UL (ref 150–400)
PMV BLD AUTO: 10.3 FL (ref 8.9–12.9)
POTASSIUM SERPL-SCNC: 3.8 MMOL/L (ref 3.5–5.1)
PROT SERPL-MCNC: 6.7 G/DL (ref 6.4–8.2)
PROTHROMBIN TIME: 10.9 SEC (ref 9–11.1)
RBC # BLD AUTO: 4.1 M/UL (ref 3.8–5.2)
SODIUM SERPL-SCNC: 140 MMOL/L (ref 136–145)
SPECIMEN EXP DATE BLD: NORMAL
TSH SERPL DL<=0.05 MIU/L-ACNC: 2.54 UIU/ML (ref 0.36–3.74)
WBC # BLD AUTO: 6.3 K/UL (ref 3.6–11)

## 2024-01-11 PROCEDURE — 83880 ASSAY OF NATRIURETIC PEPTIDE: CPT

## 2024-01-11 PROCEDURE — 85610 PROTHROMBIN TIME: CPT

## 2024-01-11 PROCEDURE — 83735 ASSAY OF MAGNESIUM: CPT

## 2024-01-11 PROCEDURE — 87635 SARS-COV-2 COVID-19 AMP PRB: CPT

## 2024-01-11 PROCEDURE — 83036 HEMOGLOBIN GLYCOSYLATED A1C: CPT

## 2024-01-11 PROCEDURE — 36415 COLL VENOUS BLD VENIPUNCTURE: CPT

## 2024-01-11 PROCEDURE — 85027 COMPLETE CBC AUTOMATED: CPT

## 2024-01-11 PROCEDURE — 86901 BLOOD TYPING SEROLOGIC RH(D): CPT

## 2024-01-11 PROCEDURE — 86921 COMPATIBILITY TEST INCUBATE: CPT

## 2024-01-11 PROCEDURE — 80053 COMPREHEN METABOLIC PANEL: CPT

## 2024-01-11 PROCEDURE — 86900 BLOOD TYPING SEROLOGIC ABO: CPT

## 2024-01-11 PROCEDURE — 84443 ASSAY THYROID STIM HORMONE: CPT

## 2024-01-11 PROCEDURE — 86870 RBC ANTIBODY IDENTIFICATION: CPT

## 2024-01-11 PROCEDURE — 86922 COMPATIBILITY TEST ANTIGLOB: CPT

## 2024-01-11 PROCEDURE — 86920 COMPATIBILITY TEST SPIN: CPT

## 2024-01-11 PROCEDURE — 86905 BLOOD TYPING RBC ANTIGENS: CPT

## 2024-01-11 PROCEDURE — 86850 RBC ANTIBODY SCREEN: CPT

## 2024-01-11 RX ORDER — NAPROXEN 250 MG/1
250 TABLET ORAL 2 TIMES DAILY PRN
Status: ON HOLD | COMMUNITY
End: 2024-01-17

## 2024-01-11 RX ORDER — METRONIDAZOLE 7.5 MG/G
GEL TOPICAL 2 TIMES DAILY
COMMUNITY

## 2024-01-11 RX ORDER — FLUOCINONIDE 0.5 MG/G
OINTMENT TOPICAL
COMMUNITY

## 2024-01-11 RX ORDER — LANOLIN ALCOHOL/MO/W.PET/CERES
400 CREAM (GRAM) TOPICAL
COMMUNITY

## 2024-01-11 RX ORDER — ESTRADIOL 0.1 MG/G
2 CREAM VAGINAL
COMMUNITY

## 2024-01-11 ASSESSMENT — PAIN SCALES - GENERAL: PAINLEVEL_OUTOF10: 0

## 2024-01-11 NOTE — PROGRESS NOTES
[facility]  Preoperative Instructions        Surgery Date 1/17/24          Time of Arrival-6am     1. On the day of your surgery, please report to the Cath Lab on 2nd floor. Come in [facility] entrance of hospital and take gold elevators to 2nd floor.     2. You must have someone with you to drive you home. You should not drive a car for 24 hours following surgery. Please make arrangements for a friend or family member to stay with you for the first 24 hours after your surgery.    3. Do not have anything to eat or drink (including water, gum, mints, coffee, juice) after midnight 1/16.This may not apply to medications prescribed by your physician. ?(Please note below the special instructions with medications to take the morning of your procedure.)    4. We recommend you do not drink any alcoholic beverages for 24 hours before and after your surgery.    5. Contact your surgeon’s office for instructions on the following medications: non-steroidal anti-inflammatory drugs (i.e. Advil, Aleve), vitamins, and supplements. (Some surgeon’s will want you to stop these medications prior to surgery and others may allow you to take them)  **If you are currently taking Plavix, Coumadin, Aspirin and/or other blood-thinning agents, contact your surgeon for instructions.** Your surgeon will partner with the physician prescribing these medications to determine if it is safe to stop or if you need to continue taking.  Please do not stop taking these medications without instructions from your surgeon    6. Wear comfortable clothes.  Wear glasses instead of contacts.  Do not bring any money or jewelry. Please bring picture ID, insurance card, and any prearranged co-payment or hospital payment.  Do not wear make-up, particularly mascara the morning of your surgery.  Do not wear nail polish, particularly if you are having foot /hand surgery.  Wear your hair loose or down, no ponytails, buns, tianna pins or clips.

## 2024-01-11 NOTE — PROGRESS NOTES

## 2024-01-11 NOTE — CONSENT
I have discussed with the patient the rationale for blood component transfusion; its benefits in treating or preventing fatigue, organ damage, or death; and its risk which includes mild transfusion reactions, rare risk of blood borne infection, or more serious but rare reactions. I have discussed the alternatives to transfusion, including the risk and consequences of not receiving transfusion. The patient had an opportunity to ask questions and had agreed to proceed with transfusion of blood components.

## 2024-01-11 NOTE — PROGRESS NOTES

## 2024-01-12 LAB
SARS-COV-2 RNA RESP QL NAA+PROBE: NOT DETECTED
SOURCE: NORMAL

## 2024-01-12 NOTE — PROGRESS NOTES
Called patient to follow up on wether she found copy of DNR. She states she looked and she does not have a DNR, just an Advance Directive. Called to speak to Madelin Casey NP for Dr. Monte to let her know but she has left the office for the day. Will call her back Monday and tell her.

## 2024-01-17 ENCOUNTER — ANESTHESIA (OUTPATIENT)
Facility: HOSPITAL | Age: 74
DRG: 267 | End: 2024-01-17
Payer: MEDICARE

## 2024-01-17 ENCOUNTER — ANESTHESIA EVENT (OUTPATIENT)
Facility: HOSPITAL | Age: 74
DRG: 267 | End: 2024-01-17
Payer: MEDICARE

## 2024-01-17 ENCOUNTER — APPOINTMENT (OUTPATIENT)
Facility: HOSPITAL | Age: 74
DRG: 267 | End: 2024-01-17
Attending: INTERNAL MEDICINE
Payer: MEDICARE

## 2024-01-17 ENCOUNTER — HOSPITAL ENCOUNTER (INPATIENT)
Facility: HOSPITAL | Age: 74
LOS: 1 days | Discharge: HOME | DRG: 267 | End: 2024-01-18
Attending: INTERNAL MEDICINE | Admitting: INTERNAL MEDICINE
Payer: MEDICARE

## 2024-01-17 DIAGNOSIS — I35.0 AORTIC STENOSIS: Primary | ICD-10-CM

## 2024-01-17 DIAGNOSIS — Z95.2 S/P TAVR (TRANSCATHETER AORTIC VALVE REPLACEMENT): ICD-10-CM

## 2024-01-17 DIAGNOSIS — Q24.8 ABNORMAL CARDIAC VALVE: ICD-10-CM

## 2024-01-17 DIAGNOSIS — Z95.2 S/P TAVR (TRANSCATHETER AORTIC VALVE REPLACEMENT): Primary | ICD-10-CM

## 2024-01-17 LAB
ACT BLD: 142 SECS (ref 79–138)
ACT BLD: 266 SECS (ref 79–138)
BASOPHILS # BLD: 0.1 K/UL (ref 0–0.1)
BASOPHILS NFR BLD: 1 % (ref 0–1)
DIFFERENTIAL METHOD BLD: ABNORMAL
ECHO AO ROOT DIAM: 2.8 CM
ECHO AO ROOT INDEX: 1.44 CM/M2
ECHO AV AREA PEAK VELOCITY: 2.5 CM2
ECHO AV AREA VTI: 3 CM2
ECHO AV AREA/BSA PEAK VELOCITY: 1.3 CM2/M2
ECHO AV AREA/BSA VTI: 1.5 CM2/M2
ECHO AV MEAN GRADIENT: 10 MMHG
ECHO AV MEAN VELOCITY: 1.5 M/S
ECHO AV PEAK GRADIENT: 20 MMHG
ECHO AV PEAK VELOCITY: 2.2 M/S
ECHO AV VELOCITY RATIO: 0.59
ECHO AV VTI: 46.1 CM
ECHO BSA: 1.95 M2
ECHO EST RA PRESSURE: 10 MMHG
ECHO LA DIAMETER INDEX: 1.55 CM/M2
ECHO LA DIAMETER: 3 CM
ECHO LA TO AORTIC ROOT RATIO: 1.07
ECHO LA VOL A-L A2C: 39 ML (ref 22–52)
ECHO LA VOL A-L A4C: 85 ML (ref 22–52)
ECHO LA VOL MOD A2C: 35 ML (ref 22–52)
ECHO LA VOL MOD A4C: 81 ML (ref 22–52)
ECHO LA VOLUME AREA LENGTH: 62 ML
ECHO LA VOLUME INDEX A-L A2C: 20 ML/M2 (ref 16–34)
ECHO LA VOLUME INDEX A-L A4C: 44 ML/M2 (ref 16–34)
ECHO LA VOLUME INDEX AREA LENGTH: 32 ML/M2 (ref 16–34)
ECHO LA VOLUME INDEX MOD A2C: 18 ML/M2 (ref 16–34)
ECHO LA VOLUME INDEX MOD A4C: 42 ML/M2 (ref 16–34)
ECHO LV E' LATERAL VELOCITY: 6 CM/S
ECHO LV E' SEPTAL VELOCITY: 5 CM/S
ECHO LV FRACTIONAL SHORTENING: 32 % (ref 28–44)
ECHO LV INTERNAL DIMENSION DIASTOLE INDEX: 1.91 CM/M2
ECHO LV INTERNAL DIMENSION DIASTOLIC: 3.7 CM (ref 3.9–5.3)
ECHO LV INTERNAL DIMENSION SYSTOLIC INDEX: 1.29 CM/M2
ECHO LV INTERNAL DIMENSION SYSTOLIC: 2.5 CM
ECHO LV IVSD: 1.3 CM (ref 0.6–0.9)
ECHO LV MASS 2D: 156.7 G (ref 67–162)
ECHO LV MASS INDEX 2D: 80.8 G/M2 (ref 43–95)
ECHO LV POSTERIOR WALL DIASTOLIC: 1.2 CM (ref 0.6–0.9)
ECHO LV RELATIVE WALL THICKNESS RATIO: 0.65
ECHO LVOT AREA: 4.2 CM2
ECHO LVOT AV VTI INDEX: 0.71
ECHO LVOT DIAM: 2.3 CM
ECHO LVOT MEAN GRADIENT: 4 MMHG
ECHO LVOT PEAK GRADIENT: 7 MMHG
ECHO LVOT PEAK VELOCITY: 1.3 M/S
ECHO LVOT STROKE VOLUME INDEX: 69.8 ML/M2
ECHO LVOT SV: 135.4 ML
ECHO LVOT VTI: 32.6 CM
ECHO MV A VELOCITY: 0.96 M/S
ECHO MV E DECELERATION TIME (DT): 277.2 MS
ECHO MV E VELOCITY: 0.72 M/S
ECHO MV E/A RATIO: 0.75
ECHO MV E/E' LATERAL: 12
ECHO MV E/E' RATIO (AVERAGED): 13.2
ECHO RA VOLUME: 64 ML
ECHO RA VOLUME: 69 ML
ECHO RIGHT VENTRICULAR SYSTOLIC PRESSURE (RVSP): 36 MMHG
ECHO RV TAPSE: 2.2 CM (ref 1.7–?)
ECHO TV REGURGITANT MAX VELOCITY: 2.54 M/S
ECHO TV REGURGITANT PEAK GRADIENT: 26 MMHG
EOSINOPHIL # BLD: 0.1 K/UL (ref 0–0.4)
EOSINOPHIL NFR BLD: 2 % (ref 0–7)
ERYTHROCYTE [DISTWIDTH] IN BLOOD BY AUTOMATED COUNT: 12.9 % (ref 11.5–14.5)
GLUCOSE BLD STRIP.AUTO-MCNC: 109 MG/DL (ref 65–117)
GLUCOSE BLD STRIP.AUTO-MCNC: 137 MG/DL (ref 65–117)
GLUCOSE BLD STRIP.AUTO-MCNC: 145 MG/DL (ref 65–117)
GLUCOSE BLD STRIP.AUTO-MCNC: 150 MG/DL (ref 65–117)
HCT VFR BLD AUTO: 39.5 % (ref 35–47)
HGB BLD-MCNC: 12.5 G/DL (ref 11.5–16)
IMM GRANULOCYTES # BLD AUTO: 0 K/UL (ref 0–0.04)
IMM GRANULOCYTES NFR BLD AUTO: 0 % (ref 0–0.5)
LYMPHOCYTES # BLD: 1.1 K/UL (ref 0.8–3.5)
LYMPHOCYTES NFR BLD: 15 % (ref 12–49)
MCH RBC QN AUTO: 30 PG (ref 26–34)
MCHC RBC AUTO-ENTMCNC: 31.6 G/DL (ref 30–36.5)
MCV RBC AUTO: 94.7 FL (ref 80–99)
MONOCYTES # BLD: 0.3 K/UL (ref 0–1)
MONOCYTES NFR BLD: 5 % (ref 5–13)
NEUTS SEG # BLD: 5.5 K/UL (ref 1.8–8)
NEUTS SEG NFR BLD: 77 % (ref 32–75)
NRBC # BLD: 0 K/UL (ref 0–0.01)
NRBC BLD-RTO: 0 PER 100 WBC
PLATELET # BLD AUTO: 146 K/UL (ref 150–400)
PMV BLD AUTO: 10.1 FL (ref 8.9–12.9)
RBC # BLD AUTO: 4.17 M/UL (ref 3.8–5.2)
SERVICE CMNT-IMP: ABNORMAL
SERVICE CMNT-IMP: NORMAL
WBC # BLD AUTO: 7.1 K/UL (ref 3.6–11)

## 2024-01-17 PROCEDURE — 82962 GLUCOSE BLOOD TEST: CPT

## 2024-01-17 PROCEDURE — 6370000000 HC RX 637 (ALT 250 FOR IP)

## 2024-01-17 PROCEDURE — C1894 INTRO/SHEATH, NON-LASER: HCPCS | Performed by: INTERNAL MEDICINE

## 2024-01-17 PROCEDURE — 33210 INSERT ELECTRD/PM CATH SNGL: CPT | Performed by: INTERNAL MEDICINE

## 2024-01-17 PROCEDURE — 6360000004 HC RX CONTRAST MEDICATION: Performed by: INTERNAL MEDICINE

## 2024-01-17 PROCEDURE — 93925 LOWER EXTREMITY STUDY: CPT

## 2024-01-17 PROCEDURE — 6360000002 HC RX W HCPCS: Performed by: NURSE ANESTHETIST, CERTIFIED REGISTERED

## 2024-01-17 PROCEDURE — C8929 TTE W OR WO FOL WCON,DOPPLER: HCPCS

## 2024-01-17 PROCEDURE — C1889 IMPLANT/INSERT DEVICE, NOC: HCPCS | Performed by: INTERNAL MEDICINE

## 2024-01-17 PROCEDURE — 93567 NJX CAR CTH SPRVLV AORTGRPHY: CPT | Performed by: INTERNAL MEDICINE

## 2024-01-17 PROCEDURE — 3700000000 HC ANESTHESIA ATTENDED CARE: Performed by: INTERNAL MEDICINE

## 2024-01-17 PROCEDURE — 2500000003 HC RX 250 WO HCPCS: Performed by: INTERNAL MEDICINE

## 2024-01-17 PROCEDURE — 36415 COLL VENOUS BLD VENIPUNCTURE: CPT

## 2024-01-17 PROCEDURE — 93306 TTE W/DOPPLER COMPLETE: CPT

## 2024-01-17 PROCEDURE — 3700000001 HC ADD 15 MINUTES (ANESTHESIA): Performed by: INTERNAL MEDICINE

## 2024-01-17 PROCEDURE — 2580000003 HC RX 258

## 2024-01-17 PROCEDURE — 2500000003 HC RX 250 WO HCPCS: Performed by: NURSE ANESTHETIST, CERTIFIED REGISTERED

## 2024-01-17 PROCEDURE — 02RF38Z REPLACEMENT OF AORTIC VALVE WITH ZOOPLASTIC TISSUE, PERCUTANEOUS APPROACH: ICD-10-PCS | Performed by: THORACIC SURGERY (CARDIOTHORACIC VASCULAR SURGERY)

## 2024-01-17 PROCEDURE — C1760 CLOSURE DEV, VASC: HCPCS | Performed by: INTERNAL MEDICINE

## 2024-01-17 PROCEDURE — C1769 GUIDE WIRE: HCPCS | Performed by: INTERNAL MEDICINE

## 2024-01-17 PROCEDURE — 33361 REPLACE AORTIC VALVE PERQ: CPT | Performed by: THORACIC SURGERY (CARDIOTHORACIC VASCULAR SURGERY)

## 2024-01-17 PROCEDURE — 2060000000 HC ICU INTERMEDIATE R&B

## 2024-01-17 PROCEDURE — 85347 COAGULATION TIME ACTIVATED: CPT

## 2024-01-17 PROCEDURE — 2709999900 HC NON-CHARGEABLE SUPPLY: Performed by: INTERNAL MEDICINE

## 2024-01-17 PROCEDURE — 85025 COMPLETE CBC W/AUTO DIFF WBC: CPT

## 2024-01-17 PROCEDURE — 2580000003 HC RX 258: Performed by: NURSE ANESTHETIST, CERTIFIED REGISTERED

## 2024-01-17 PROCEDURE — 6360000002 HC RX W HCPCS

## 2024-01-17 DEVICE — EDWARDS SAPIEN 3 ULTRA TRANSCATHETER HEART VALVE
Type: IMPLANTABLE DEVICE | Status: FUNCTIONAL
Brand: EDWARDS SAPIEN 3 ULTRA TRANSCATHETER HEART VALVE

## 2024-01-17 RX ORDER — SODIUM CHLORIDE 0.9 % (FLUSH) 0.9 %
5-40 SYRINGE (ML) INJECTION EVERY 12 HOURS SCHEDULED
Status: DISCONTINUED | OUTPATIENT
Start: 2024-01-17 | End: 2024-01-17 | Stop reason: HOSPADM

## 2024-01-17 RX ORDER — HEPARIN SODIUM 1000 [USP'U]/ML
INJECTION, SOLUTION INTRAVENOUS; SUBCUTANEOUS PRN
Status: DISCONTINUED | OUTPATIENT
Start: 2024-01-17 | End: 2024-01-17 | Stop reason: SDUPTHER

## 2024-01-17 RX ORDER — ASPIRIN 81 MG/1
81 TABLET ORAL NIGHTLY
Status: DISCONTINUED | OUTPATIENT
Start: 2024-01-17 | End: 2024-01-18 | Stop reason: HOSPADM

## 2024-01-17 RX ORDER — LIDOCAINE HYDROCHLORIDE 10 MG/ML
INJECTION, SOLUTION INFILTRATION; PERINEURAL PRN
Status: DISCONTINUED | OUTPATIENT
Start: 2024-01-17 | End: 2024-01-17 | Stop reason: HOSPADM

## 2024-01-17 RX ORDER — INSULIN LISPRO 100 [IU]/ML
0-8 INJECTION, SOLUTION INTRAVENOUS; SUBCUTANEOUS
Status: DISCONTINUED | OUTPATIENT
Start: 2024-01-17 | End: 2024-01-18 | Stop reason: HOSPADM

## 2024-01-17 RX ORDER — MAGNESIUM HYDROXIDE/ALUMINUM HYDROXICE/SIMETHICONE 120; 1200; 1200 MG/30ML; MG/30ML; MG/30ML
15 SUSPENSION ORAL EVERY 6 HOURS PRN
Status: DISCONTINUED | OUTPATIENT
Start: 2024-01-17 | End: 2024-01-18 | Stop reason: HOSPADM

## 2024-01-17 RX ORDER — SODIUM CHLORIDE 0.9 % (FLUSH) 0.9 %
5-40 SYRINGE (ML) INJECTION PRN
Status: DISCONTINUED | OUTPATIENT
Start: 2024-01-17 | End: 2024-01-17 | Stop reason: HOSPADM

## 2024-01-17 RX ORDER — DEXTROSE MONOHYDRATE 100 MG/ML
INJECTION, SOLUTION INTRAVENOUS CONTINUOUS PRN
Status: DISCONTINUED | OUTPATIENT
Start: 2024-01-17 | End: 2024-01-18 | Stop reason: HOSPADM

## 2024-01-17 RX ORDER — FENTANYL CITRATE 50 UG/ML
INJECTION, SOLUTION INTRAMUSCULAR; INTRAVENOUS PRN
Status: DISCONTINUED | OUTPATIENT
Start: 2024-01-17 | End: 2024-01-17 | Stop reason: SDUPTHER

## 2024-01-17 RX ORDER — FAMOTIDINE 20 MG/1
40 TABLET, FILM COATED ORAL NIGHTLY
Status: DISCONTINUED | OUTPATIENT
Start: 2024-01-17 | End: 2024-01-18 | Stop reason: HOSPADM

## 2024-01-17 RX ORDER — INSULIN LISPRO 100 [IU]/ML
0-4 INJECTION, SOLUTION INTRAVENOUS; SUBCUTANEOUS NIGHTLY
Status: DISCONTINUED | OUTPATIENT
Start: 2024-01-17 | End: 2024-01-18 | Stop reason: HOSPADM

## 2024-01-17 RX ORDER — PROTAMINE SULFATE 10 MG/ML
INJECTION, SOLUTION INTRAVENOUS PRN
Status: DISCONTINUED | OUTPATIENT
Start: 2024-01-17 | End: 2024-01-17 | Stop reason: SDUPTHER

## 2024-01-17 RX ORDER — IPRATROPIUM BROMIDE AND ALBUTEROL SULFATE 2.5; .5 MG/3ML; MG/3ML
1 SOLUTION RESPIRATORY (INHALATION) EVERY 4 HOURS PRN
Status: DISCONTINUED | OUTPATIENT
Start: 2024-01-17 | End: 2024-01-18 | Stop reason: HOSPADM

## 2024-01-17 RX ORDER — ONDANSETRON 4 MG/1
4 TABLET, ORALLY DISINTEGRATING ORAL EVERY 8 HOURS PRN
Status: DISCONTINUED | OUTPATIENT
Start: 2024-01-17 | End: 2024-01-18 | Stop reason: HOSPADM

## 2024-01-17 RX ORDER — DEXMEDETOMIDINE HYDROCHLORIDE 100 UG/ML
INJECTION, SOLUTION INTRAVENOUS PRN
Status: DISCONTINUED | OUTPATIENT
Start: 2024-01-17 | End: 2024-01-17 | Stop reason: SDUPTHER

## 2024-01-17 RX ORDER — ONDANSETRON 2 MG/ML
INJECTION INTRAMUSCULAR; INTRAVENOUS PRN
Status: DISCONTINUED | OUTPATIENT
Start: 2024-01-17 | End: 2024-01-17 | Stop reason: SDUPTHER

## 2024-01-17 RX ORDER — SODIUM CHLORIDE, SODIUM LACTATE, POTASSIUM CHLORIDE, CALCIUM CHLORIDE 600; 310; 30; 20 MG/100ML; MG/100ML; MG/100ML; MG/100ML
INJECTION, SOLUTION INTRAVENOUS CONTINUOUS PRN
Status: DISCONTINUED | OUTPATIENT
Start: 2024-01-17 | End: 2024-01-17 | Stop reason: SDUPTHER

## 2024-01-17 RX ORDER — NALOXONE HYDROCHLORIDE 0.4 MG/ML
0.4 INJECTION, SOLUTION INTRAMUSCULAR; INTRAVENOUS; SUBCUTANEOUS PRN
Status: DISCONTINUED | OUTPATIENT
Start: 2024-01-17 | End: 2024-01-18 | Stop reason: HOSPADM

## 2024-01-17 RX ORDER — ROSUVASTATIN CALCIUM 10 MG/1
20 TABLET, COATED ORAL NIGHTLY
Status: DISCONTINUED | OUTPATIENT
Start: 2024-01-17 | End: 2024-01-18 | Stop reason: HOSPADM

## 2024-01-17 RX ORDER — ACETAMINOPHEN 325 MG/1
650 TABLET ORAL EVERY 4 HOURS PRN
Status: DISCONTINUED | OUTPATIENT
Start: 2024-01-17 | End: 2024-01-18 | Stop reason: HOSPADM

## 2024-01-17 RX ORDER — SODIUM CHLORIDE 9 MG/ML
INJECTION, SOLUTION INTRAVENOUS PRN
Status: DISCONTINUED | OUTPATIENT
Start: 2024-01-17 | End: 2024-01-17 | Stop reason: HOSPADM

## 2024-01-17 RX ORDER — SODIUM CHLORIDE, SODIUM LACTATE, POTASSIUM CHLORIDE, CALCIUM CHLORIDE 600; 310; 30; 20 MG/100ML; MG/100ML; MG/100ML; MG/100ML
INJECTION, SOLUTION INTRAVENOUS CONTINUOUS
Status: DISCONTINUED | OUTPATIENT
Start: 2024-01-17 | End: 2024-01-18

## 2024-01-17 RX ORDER — LANOLIN ALCOHOL/MO/W.PET/CERES
400 CREAM (GRAM) TOPICAL
Status: DISCONTINUED | OUTPATIENT
Start: 2024-01-17 | End: 2024-01-18 | Stop reason: HOSPADM

## 2024-01-17 RX ORDER — SODIUM CHLORIDE 0.9 % (FLUSH) 0.9 %
5-40 SYRINGE (ML) INJECTION EVERY 12 HOURS SCHEDULED
Status: DISCONTINUED | OUTPATIENT
Start: 2024-01-17 | End: 2024-01-18 | Stop reason: HOSPADM

## 2024-01-17 RX ORDER — SODIUM CHLORIDE 0.9 % (FLUSH) 0.9 %
5-40 SYRINGE (ML) INJECTION PRN
Status: DISCONTINUED | OUTPATIENT
Start: 2024-01-17 | End: 2024-01-18 | Stop reason: HOSPADM

## 2024-01-17 RX ORDER — ONDANSETRON 2 MG/ML
4 INJECTION INTRAMUSCULAR; INTRAVENOUS EVERY 6 HOURS PRN
Status: DISCONTINUED | OUTPATIENT
Start: 2024-01-17 | End: 2024-01-18 | Stop reason: HOSPADM

## 2024-01-17 RX ORDER — SODIUM CHLORIDE 9 MG/ML
INJECTION, SOLUTION INTRAVENOUS PRN
Status: DISCONTINUED | OUTPATIENT
Start: 2024-01-17 | End: 2024-01-18 | Stop reason: HOSPADM

## 2024-01-17 RX ORDER — NAPROXEN 250 MG/1
250 TABLET ORAL 2 TIMES DAILY PRN
Qty: 60 TABLET | Refills: 3 | Status: SHIPPED
Start: 2024-01-24

## 2024-01-17 RX ADMIN — SODIUM CHLORIDE, POTASSIUM CHLORIDE, SODIUM LACTATE AND CALCIUM CHLORIDE: 600; 310; 30; 20 INJECTION, SOLUTION INTRAVENOUS at 11:09

## 2024-01-17 RX ADMIN — PROTAMINE SULFATE 10 MG: 10 INJECTION, SOLUTION INTRAVENOUS at 09:14

## 2024-01-17 RX ADMIN — PROTAMINE SULFATE 10 MG: 10 INJECTION, SOLUTION INTRAVENOUS at 09:13

## 2024-01-17 RX ADMIN — PERFLUTREN 1.5 ML: 6.52 INJECTION, SUSPENSION INTRAVENOUS at 14:49

## 2024-01-17 RX ADMIN — SODIUM CHLORIDE, POTASSIUM CHLORIDE, SODIUM LACTATE AND CALCIUM CHLORIDE: 600; 310; 30; 20 INJECTION, SOLUTION INTRAVENOUS at 07:39

## 2024-01-17 RX ADMIN — FENTANYL CITRATE 25 MCG: 50 INJECTION, SOLUTION INTRAMUSCULAR; INTRAVENOUS at 08:17

## 2024-01-17 RX ADMIN — PROPOFOL 20 MG: 10 INJECTION, EMULSION INTRAVENOUS at 07:51

## 2024-01-17 RX ADMIN — PHENYLEPHRINE HYDROCHLORIDE 25 MCG/MIN: 10 INJECTION INTRAVENOUS at 07:51

## 2024-01-17 RX ADMIN — ALUMINUM HYDROXIDE, MAGNESIUM HYDROXIDE, AND SIMETHICONE 15 ML: 200; 200; 20 SUSPENSION ORAL at 14:57

## 2024-01-17 RX ADMIN — WATER 2000 MG: 1 INJECTION INTRAMUSCULAR; INTRAVENOUS; SUBCUTANEOUS at 07:54

## 2024-01-17 RX ADMIN — PROTAMINE SULFATE 10 MG: 10 INJECTION, SOLUTION INTRAVENOUS at 09:15

## 2024-01-17 RX ADMIN — HEPARIN SODIUM 10000 UNITS: 1000 INJECTION, SOLUTION INTRAVENOUS; SUBCUTANEOUS at 08:44

## 2024-01-17 RX ADMIN — PHENYLEPHRINE HYDROCHLORIDE 80 MCG: 10 INJECTION INTRAVENOUS at 08:35

## 2024-01-17 RX ADMIN — DEXMEDETOMIDINE HYDROCHLORIDE 10 MCG: 100 INJECTION, SOLUTION, CONCENTRATE INTRAVENOUS at 07:42

## 2024-01-17 RX ADMIN — PROTAMINE SULFATE 10 MG: 10 INJECTION, SOLUTION INTRAVENOUS at 09:16

## 2024-01-17 RX ADMIN — ASPIRIN 81 MG: 81 TABLET, COATED ORAL at 21:38

## 2024-01-17 RX ADMIN — PHENYLEPHRINE HYDROCHLORIDE 120 MCG: 10 INJECTION INTRAVENOUS at 09:00

## 2024-01-17 RX ADMIN — ONDANSETRON HYDROCHLORIDE 4 MG: 2 INJECTION, SOLUTION INTRAMUSCULAR; INTRAVENOUS at 09:31

## 2024-01-17 RX ADMIN — PROTAMINE SULFATE 30 MG: 10 INJECTION, SOLUTION INTRAVENOUS at 09:17

## 2024-01-17 RX ADMIN — ROSUVASTATIN CALCIUM 20 MG: 10 TABLET, COATED ORAL at 21:37

## 2024-01-17 RX ADMIN — FAMOTIDINE 40 MG: 20 TABLET, FILM COATED ORAL at 21:38

## 2024-01-17 RX ADMIN — PROPOFOL 70 MCG/KG/MIN: 10 INJECTION, EMULSION INTRAVENOUS at 07:45

## 2024-01-17 RX ADMIN — SODIUM CHLORIDE, PRESERVATIVE FREE 10 ML: 5 INJECTION INTRAVENOUS at 21:38

## 2024-01-17 RX ADMIN — Medication 400 MG: at 17:16

## 2024-01-17 RX ADMIN — PHENYLEPHRINE HYDROCHLORIDE 80 MCG: 10 INJECTION INTRAVENOUS at 08:58

## 2024-01-17 RX ADMIN — FENTANYL CITRATE 25 MCG: 50 INJECTION, SOLUTION INTRAMUSCULAR; INTRAVENOUS at 07:23

## 2024-01-17 ASSESSMENT — PAIN SCALES - GENERAL
PAINLEVEL_OUTOF10: 3
PAINLEVEL_OUTOF10: 1
PAINLEVEL_OUTOF10: 3

## 2024-01-17 ASSESSMENT — EJECTION FRACTION
EF_VALUE: .42
EF_VALUE: .36

## 2024-01-17 ASSESSMENT — PAIN DESCRIPTION - DESCRIPTORS: DESCRIPTORS: DISCOMFORT

## 2024-01-17 NOTE — PROGRESS NOTES
1043: Patient arrived to IVCU from recovery; and left and right groin sites are CDI. Right radial site is CDI with quick clot and Tegaderm dressing. Patient denies having CP or SOB.   1320: Patient ambulated to bathroom; left femoral site bleeding, hand held pressure held for 10 minutes by this RN. No hematoma noted. Quick clot and Tegaderm dressing applied to right femoral site. Patient denies having CP or SOB. NP Jacinto notified.   1715: Patient got OOB to chair; right and left femoral sites are CDI. Patient denies having CP or SOB.      Predictive Model Details          19 (Normal)  Factor Value    Calculated 1/17/2024 18:40 73% Age 73 years old    Deterioration Index Model 14% Respiratory rate 18     6% Systolic 127     3% Platelet count abnormal (146 K/uL)     3% Pulse 61     1% Temperature 97.5 °F (36.4 °C)     0% WBC count 7.1 K/uL     0% Pulse oximetry 96 %     0% Hematocrit 39.5 %

## 2024-01-17 NOTE — H&P
Update History & Physical    The patient's History and Physical of January 11, 2024 was reviewed with the patient and I examined the patient. There was no change. The surgical site was confirmed by the patient and me.       Plan: The risks, benefits, expected outcome, and alternative to the recommended procedure have been discussed with the patient. Patient understands and wants to proceed with the procedure.     Electronically signed by DANIELLA Sierra NP on 1/17/2024 at 7:08 AM    
Mell and Oren felt that TAVR is a better option for this patient, given age, frailty, and co-morbidities. With all the options available, the patient has agreed to proceed with TAVR for the treatment of her aortic stenosis and will be scheduled for January 17, 2024.   HTN: On Norvasc, losartan PTA; continue.   HLD: on statin PTA; continue.   DMII: on Jardiance, metformin PTA. Check A1C at PAT. Would hold Jardiance 3 days before procedure and metformin 48 hours before procedure.   Mild carotid stenosis: Cont ASA, statin. OP follow up with Vascular.   Pancreatic cystic lesion: OP follow up with GI.

## 2024-01-17 NOTE — PROGRESS NOTES
Cardiac Cath Lab Recovery Arrival Note:      Tamia Cruz arrived to Cardiac Cath Lab, Recovery Area. Staff introduced to patient. Patient identifiers verified with NAME and DATE OF BIRTH. Procedure verified with patient. Consent forms reviewed and signed by patient or authorized representative and verified. Allergies verified.     Patient and family oriented to department. Patient and family informed of procedure and plan of care.     Questions answered with review. Patient prepped for procedure, per orders from physician, prior to arrival.    Patient on cardiac monitor, non-invasive blood pressure, SPO2 monitor. On RA. Patient is A&Ox 4. Patient reports no pain.     Patient in stretcher, in low position, with side rails up, call bell within reach, patient instructed to call if assistance as needed.    Patient prep in: [facility] Recovery Area, [place] [address].     Family in: [place], .   Prep by: Xavier and Clotilde

## 2024-01-17 NOTE — PROGRESS NOTES
TRANSFER - OUT REPORT:    Verbal report given to [facility] on [facility] being transferred to IVCU for routine post procedure       Report consisted of patient’s Situation, Background, Assessment and   Recommendations(SBAR).     Information from the following report(s) VS, procedure, access sites RFA/ LFA/ LFV was reviewed with the receiving nurse.    Opportunity for questions and clarification was provided.

## 2024-01-17 NOTE — PROGRESS NOTES
Clinical Update:      Tamia Cruz arrived to [facility] from cath lab s/p transcatheter aortic valve replacement with #23 S3 by Kathi Monte and Oren under MAC.      Patient is groggy but wakes to speech. Bilateral groin sites soft with gauze dressing-clean, dry, intact. PPP.     Anticoagulation plan for ASA. Plan for postoperative TTE and EKG.    Vitals:    01/17/24 1021   BP: (!) 122/41   Pulse: 52   Resp: 14   Temp:    SpO2: 97%     Blood pressures on soft side, still waking up from MAC- will hold off on Norvasc for now- can resume when Bps uptrend.       Signed:  DANIELLA Sierra NP  1/17/2024  10:32 AM

## 2024-01-17 NOTE — PROGRESS NOTES
Received notification from nursing at 1319 that pt's L groin started to bleed after bedrest complete. Pressure held, groin site soft per nursing, /64. Restarting bedrest.    Updated Dr. Lott, who would like george aterial US to r/o pseudoaneuyrsm- orders placed.

## 2024-01-17 NOTE — OP NOTE
Transcatheter Aortic Valve Replacement Percutaneously through the Femoral Artery     Indication  The valve was placed for severe aortic stenosis. Low risk.     Interventional cardiologist: Lv Lott MD, Syed Hurtado MD  Cardiac surgeon: Silverio Monte MD.     Access  The valve was placed using a transfemoral approach.   Initially a 6Fr sheath was placed in the RFA, 6Fr long sheath in LFV, 6Fr sheath in the LFA using ultrasound and micropuncture technique.  The LFV was used to place temporary venous wire.  The LFA was used to place a pig tail for angiography.  The RFA was preclosed with 2 perclose sutures.   Then delivery sheath was placed and patient heparinized.     Procedure     Prior to placement of the aortic valve, an aortogram were performed.  Left heart catheterization was peformed.  A balloon aortic valvuloplasty was not performed prior to valve placement.  A 23 mm Evans Ailyn S3 Ultra aortic valve was then deployed with one additional cc of volume during rapid ventricular pacing. Post-deployment balloon inflation was not performed.  Cardiopulmonary bypass support was not used for hemodynamic support during the procedure.    I was personally present for the procedure. The valve insertion, positioning, rapid pacing and deployment was performed without issue. The patient recovered normal hemodynamics and sinu srhythm. The sheath was removed without issue and I personally deployed the perclose device and ensured hemostasis.      At the completion of the case, catheters were removed. Protamine was given. The valve delivery sheath was removed and hemostasis obtained by 2 perclose suture.  6Fr LFA hemostasis via external pressure. Other venous sheaths were removed and hemostasis obtained by manual compression for 15 minutes.     Hemodynamics     Pre- deplyment hemodynamics showed severe aortic stenosis with mean gradient of 44 mm Hg, with LVEDP of 10 mm Hg.  Post-deployment hemodynamics showed

## 2024-01-17 NOTE — DISCHARGE INSTRUCTIONS
Cardiac Surgery Specialist - Valve Clinic    [address]      [place] [address]  Office- [phone]  Fax- [phone]  _____________________________________________________________  Dr. Jem Hurtado, [facility]   Dr. Lv Lott, [facility]    Genoveva Casey, MSN, ACNPC-AG  Olive Brewer, MSN, AGPCPIOTR-BC  ___________________________________________________________     Surgery & Date: Procedure(s):  Transcatheter aortic valve replacement  Transcatheter aortic valve replacement  Aortic root aortogram  Aortagram abdominal w runoff  Insert temporary pacemaker  Ultrasound guided vascular access    Patient ID:  Tamia Cruz  [id number]  73 y.o.  1950    Admit Date: 1/17/2024    Discharge Date: 1/17/2024     Admitting Physician: [unfilled]     Discharge Physician: [unfilled]    Admission Diagnoses:   Abnormal cardiac valve [Q24.8]  Aortic stenosis, severe [I35.0]    Discharge Diagnoses:   [unfilled]    Discharge Condition: {condition:21515728}    Cardiology Procedures this Admission:  {CARDIOLOGY PROCEDURES:10558921}    Disposition: {disposition:65661}    Reference discharge instructions provided by nursing for diet and activity.    Signed:  DANIELLA Sierra NP  1/17/2024  9:07 AM        MEDICATIONS:  Please refer to your After Visit Summary for your medication list.           Guidelines to Follow After Your TAVR  The purpose of these instructions is to provide you with information on how to care for yourself after your Transcatheter Aortic Valve Replacement (TAVR). If you have any questions after your discharge, please call us at [phone].  Prior to discharge, you will receive a temporary implant card. A permanent card will be sent to you in approximately 6 to 8 weeks. Share this card with your doctors, including your dentist. It is

## 2024-01-17 NOTE — CARDIO/PULMONARY
Chart reviewed: Patient is 73 y.o. female admitted with Abnormal cardiac valve [Q24.8]  Aortic stenosis, severe [I35.0]    Education: Trans-catheter education folder at the bedside.  Met with Tamia Cruz.     Educated using teach back method. Reviewed daily weights and temperatures, signs and symptoms of infection at surgery sites, valve type and card, and use of incentive spirometer.  Smoking history assessed. Patient is a non smoker.   Encouraged Heart Healthy, Low Sodium (2000 mg) diet.      Discussed Cardiac Rehab Program benefits, format, and encouraged participation.Pt declined program stating that her doctors told her she \"would be bored\" with the program. She exercises at the gym and home completing 30 minutes of cardio and uses resistance machines. General questions answered. Tamia Cruz verbalized understanding.     Will continue to follow for educational needs and enrollment in the Cardiac Rehab Program.     TYRON GILL RN

## 2024-01-17 NOTE — PROGRESS NOTES
SHEATH PULL NOTE:    Patient informed of procedure with questions answered with review. Sheath site prepped with Chloraprep swab. 6 fr sheath in LFA and 6 fr sheath 25 cm in LFV pulled by Eugenia Bernabe with EUGENIA Granda. Hand hold and quick clot, with manual compression to site. No bleeding, no hematoma, no pain at site. Hemostasis obtained with hand hold/manual compression at site. Patient tolerated well. No change in status. Handhold for 20 minutes. No change at site. STerile dressing applied to site. No bleeding, no hematoma, no pain/discomfort at site. Groin instructions provided with review. Continue to monitor procedure site and patient status.    *Advised patient to keep head flat and extremity flat to decrease risk of bleeding.    *Recommended that patient not drink for ONE HOUR post sheath pull completion.    *Recommended that patient not eat for TWO HOURS post sheath pull completion.    *Instructed patient on rationale for delay of PO products to decrease risk for aspiration and if additional treatment to procedure site is required. Patient verbalized understanding of instructions with review.

## 2024-01-17 NOTE — PROGRESS NOTES
A line was removed from R wrist. Hand held pressure was applied for 5 minutes. Sterile dressing applied to the site. No bleeding, hematoma, pain or discomfort noted at the site.

## 2024-01-17 NOTE — ANESTHESIA POSTPROCEDURE EVALUATION
Department of Anesthesiology  Postprocedure Note    Patient: Tamia Cruz  MRN: [id number]  YOB: 1950  Date of evaluation: 1/17/2024    Procedure Summary       Date: 01/17/24 Room / Location: [facility] CATH LAB 1 / [facility] CARDIAC CATH LAB    Anesthesia Start: 0739 Anesthesia Stop: 0951    Procedures:       Transcatheter aortic valve replacement      Aortic root aortogram      Aortagram abdominal w runoff      Insert temporary pacemaker      Ultrasound guided vascular access      Transcatheter aortic valve replacement Diagnosis: Abnormal cardiac valve    Providers: Lv Lott MD; Richard Monte MD Responsible Provider: Chucho Draper MD    Anesthesia Type: MAC ASA Status: 4            Anesthesia Type: MAC    Reji Phase I: Erji Score: 10    Reji Phase II:      Anesthesia Post Evaluation    Patient location during evaluation: PACU  Patient participation: complete - patient participated  Level of consciousness: responsive to verbal stimuli and sleepy but conscious  Pain score: 2  Airway patency: patent  Cardiovascular status: blood pressure returned to baseline  Respiratory status: acceptable  Hydration status: stable  Comments: +Post-Anesthesia Evaluation and Assessment    Patient: Tamia Cruz MRN: [id number]  SSN: [id number]   YOB: 1950  Age: 73 y.o.  Sex: female          Cardiovascular Function/Vital Signs    BP (!) 140/64   Pulse 59   Temp 97.2 °F (36.2 °C) (Oral)   Resp 16   Ht 1.753 m (5' 9.02\")   Wt 78 kg (171 lb 15.3 oz)   SpO2 97%   BMI 25.38 kg/m²     Patient is status post Procedure(s):  Transcatheter aortic valve replacement  Transcatheter aortic valve replacement  Aortic root aortogram  Aortagram abdominal w runoff  Insert temporary pacemaker  Ultrasound guided vascular access.    Nausea/Vomiting: Controlled.    Postoperative hydration reviewed and adequate.    Pain:      Managed.    Neurological Status:       At baseline.    Mental Status and Level of

## 2024-01-17 NOTE — ANESTHESIA PROCEDURE NOTES
Arterial Line:    An arterial line was placed using surface landmarks, in the pre-op for the following indication(s): continuous blood pressure monitoring and blood sampling needed.    A 20 gauge (size) (length), Arrow (type) catheter was placed, Seldinger technique used, into the radial artery, secured by Tegaderm.  Anesthesia type: Local  Local infiltration: Injection    Events:  patient tolerated procedure well with no complications.1/17/2024 7:11 AM1/17/2024 7:19 AM  Anesthesiologist: Chucho Draper MD  Performed: Anesthesiologist   Preanesthetic Checklist  Completed: patient identified, IV checked, site marked, risks and benefits discussed, surgical/procedural consents, equipment checked, pre-op evaluation, timeout performed, anesthesia consent given, oxygen available, monitors applied/VS acknowledged and fire risk safety assessment completed and verbalized

## 2024-01-17 NOTE — PROGRESS NOTES
TRANSFER - IN REPORT:    Verbal report received from Serena on Tamia Cruz  being received from [facility] for routine care. Report consisted of patient’s Situation, Background, Assessment and Recommendations(SBAR). Information from the following report(s) procedure log was reviewed with the receiving clinician. Opportunity for questions and clarification was provided. Assessment completed upon patient’s arrival to Cardiac Cath Lab RECOVERY AREA and care assumed.       Cardiac Cath Lab Recovery Arrival Note:    Tamia Cruz arrived to [facility] recovery area.  Patient procedure= TAVR. Patient on cardiac monitor, non-invasive blood pressure, SPO2 monitor. On 4L NC  IV  of NS on pump at 75 ml/hr. Patient status doing well without problems. Patient is A&Ox 4. Patient reports no pain.    PROCEDURE SITE CHECK:    Procedure site: no hematoma, no pain/discomfort reported at procedure site. without any bleeding    No change in patient status. Continue to monitor patient and status.

## 2024-01-17 NOTE — ANESTHESIA PRE PROCEDURE
Department of Anesthesiology  Preprocedure Note       Name:  Tamia Cruz   Age:  73 y.o.  :  1950                                          MRN:  [id number]         Date:  2024      Surgeon: Surgeon(s):  Lv Lott MD Wehman, Paul B, MD    Procedure: Procedure(s):  Transcatheter aortic valve replacement  Transcatheter aortic valve replacement    Medications prior to admission:   Prior to Admission medications    Medication Sig Start Date End Date Taking? Authorizing Provider   magnesium oxide (MAG-OX) 400 (240 Mg) MG tablet Take 1 tablet by mouth Daily with supper    Juan Jose Domingo MD   Probiotic Product (PROBIOTIC ADVANCED PO) Take 1 tablet by mouth daily    ProviderJuan Jose MD   metroNIDAZOLE (METROGEL) 0.75 % gel Apply topically 2 times daily Apply topically 2 times daily.    Juan Jose Domingo MD   fluocinonide (LIDEX) 0.05 % ointment Apply topically Twice a Week    Juan Jose Domingo MD   estradiol (ESTRACE) 0.1 MG/GM vaginal cream Place 2 g vaginally Twice a Week    Juan Jose Domingo MD   naproxen (NAPROSYN) 250 MG tablet Take 1 tablet by mouth 2 times daily as needed for Pain    Juan Jose Domingo MD   empagliflozin (JARDIANCE) 10 MG tablet Take 1 tablet by mouth daily    ProviderJuan Jose MD   amLODIPine (NORVASC) 10 MG tablet Take 1 tablet by mouth daily    Automatic Reconciliation, Ar   aspirin 81 MG EC tablet Take 1 tablet by mouth nightly    Automatic Reconciliation, Ar   famotidine (PEPCID) 40 MG tablet Take 1 tablet by mouth nightly    Automatic Reconciliation, Ar   losartan (COZAAR) 100 MG tablet Take 1 tablet by mouth daily    Automatic Reconciliation, Ar   metFORMIN (GLUCOPHAGE) 1000 MG tablet Take 1 tablet by mouth 2 times daily (with meals)    Automatic Reconciliation, Ar   rosuvastatin (CRESTOR) 20 MG tablet Take 1 tablet by mouth nightly    Automatic Reconciliation, Ar       Current medications:    No current facility-administered

## 2024-01-18 ENCOUNTER — APPOINTMENT (OUTPATIENT)
Facility: HOSPITAL | Age: 74
DRG: 267 | End: 2024-01-18
Attending: INTERNAL MEDICINE
Payer: MEDICARE

## 2024-01-18 VITALS
DIASTOLIC BLOOD PRESSURE: 66 MMHG | TEMPERATURE: 97.3 F | HEIGHT: 69 IN | HEART RATE: 69 BPM | RESPIRATION RATE: 16 BRPM | SYSTOLIC BLOOD PRESSURE: 132 MMHG | OXYGEN SATURATION: 96 % | WEIGHT: 171.96 LBS | BODY MASS INDEX: 25.47 KG/M2

## 2024-01-18 LAB
ALBUMIN SERPL-MCNC: 3.9 G/DL (ref 3.5–5)
ALBUMIN/GLOB SERPL: 1.3 (ref 1.1–2.2)
ALP SERPL-CCNC: 51 U/L (ref 45–117)
ALT SERPL-CCNC: 27 U/L (ref 12–78)
ANION GAP SERPL CALC-SCNC: 1 MMOL/L (ref 5–15)
AST SERPL-CCNC: 22 U/L (ref 15–37)
BASOPHILS # BLD: 0 K/UL (ref 0–0.1)
BASOPHILS NFR BLD: 1 % (ref 0–1)
BILIRUB SERPL-MCNC: 0.6 MG/DL (ref 0.2–1)
BUN SERPL-MCNC: 14 MG/DL (ref 6–20)
BUN/CREAT SERPL: 19 (ref 12–20)
CALCIUM SERPL-MCNC: 8.4 MG/DL (ref 8.5–10.1)
CHLORIDE SERPL-SCNC: 112 MMOL/L (ref 97–108)
CO2 SERPL-SCNC: 27 MMOL/L (ref 21–32)
CREAT SERPL-MCNC: 0.75 MG/DL (ref 0.55–1.02)
DIFFERENTIAL METHOD BLD: ABNORMAL
ECHO BSA: 1.95 M2
EOSINOPHIL # BLD: 0.1 K/UL (ref 0–0.4)
EOSINOPHIL NFR BLD: 1 % (ref 0–7)
ERYTHROCYTE [DISTWIDTH] IN BLOOD BY AUTOMATED COUNT: 12.8 % (ref 11.5–14.5)
GLOBULIN SER CALC-MCNC: 2.9 G/DL (ref 2–4)
GLUCOSE BLD STRIP.AUTO-MCNC: 129 MG/DL (ref 65–117)
GLUCOSE SERPL-MCNC: 139 MG/DL (ref 65–100)
HCT VFR BLD AUTO: 41.9 % (ref 35–47)
HGB BLD-MCNC: 13.3 G/DL (ref 11.5–16)
IMM GRANULOCYTES # BLD AUTO: 0 K/UL (ref 0–0.04)
IMM GRANULOCYTES NFR BLD AUTO: 0 % (ref 0–0.5)
LYMPHOCYTES # BLD: 1.3 K/UL (ref 0.8–3.5)
LYMPHOCYTES NFR BLD: 20 % (ref 12–49)
MAGNESIUM SERPL-MCNC: 2.1 MG/DL (ref 1.6–2.4)
MCH RBC QN AUTO: 29.9 PG (ref 26–34)
MCHC RBC AUTO-ENTMCNC: 31.7 G/DL (ref 30–36.5)
MCV RBC AUTO: 94.2 FL (ref 80–99)
MONOCYTES # BLD: 0.6 K/UL (ref 0–1)
MONOCYTES NFR BLD: 9 % (ref 5–13)
NEUTS SEG # BLD: 4.7 K/UL (ref 1.8–8)
NEUTS SEG NFR BLD: 69 % (ref 32–75)
NRBC # BLD: 0 K/UL (ref 0–0.01)
NRBC BLD-RTO: 0 PER 100 WBC
PLATELET # BLD AUTO: 136 K/UL (ref 150–400)
PMV BLD AUTO: 10.2 FL (ref 8.9–12.9)
POTASSIUM SERPL-SCNC: 4 MMOL/L (ref 3.5–5.1)
PROT SERPL-MCNC: 6.8 G/DL (ref 6.4–8.2)
RBC # BLD AUTO: 4.45 M/UL (ref 3.8–5.2)
SERVICE CMNT-IMP: ABNORMAL
SODIUM SERPL-SCNC: 140 MMOL/L (ref 136–145)
WBC # BLD AUTO: 6.7 K/UL (ref 3.6–11)

## 2024-01-18 PROCEDURE — 82962 GLUCOSE BLOOD TEST: CPT

## 2024-01-18 PROCEDURE — 85025 COMPLETE CBC W/AUTO DIFF WBC: CPT

## 2024-01-18 PROCEDURE — 71045 X-RAY EXAM CHEST 1 VIEW: CPT

## 2024-01-18 PROCEDURE — 80053 COMPREHEN METABOLIC PANEL: CPT

## 2024-01-18 PROCEDURE — 83735 ASSAY OF MAGNESIUM: CPT

## 2024-01-18 PROCEDURE — 36415 COLL VENOUS BLD VENIPUNCTURE: CPT

## 2024-01-18 RX ORDER — AMLODIPINE BESYLATE 5 MG/1
10 TABLET ORAL DAILY
Status: DISCONTINUED | OUTPATIENT
Start: 2024-01-18 | End: 2024-01-18 | Stop reason: HOSPADM

## 2024-01-18 RX ORDER — ACETAMINOPHEN 325 MG/1
650 TABLET ORAL EVERY 6 HOURS PRN
Status: SHIPPED | COMMUNITY
Start: 2024-01-18

## 2024-01-18 NOTE — PROGRESS NOTES
[facility] FLOOR Progress Note    Admit Date: 2024  POD: 1 Day Post-Op      Procedure:  Procedure(s):  Transcatheter aortic valve replacement  Transcatheter aortic valve replacement  Aortic root aortogram  Aortagram abdominal w runoff  Insert temporary pacemaker  Ultrasound guided vascular access    Subjective/overnight events:   Pt discussed  with Dr. Lott. Seen laying in bed. In NSR, on room air, afebrile.   Vital signs stable. No events overnight. Bilateral groin sites stable, EKG shows NSR, labs stable, eating, drinking, ambulating at baseline, and voiding. No complaints at present.  Objective:     BP (!) 141/68   Pulse 95   Temp 97.9 °F (36.6 °C) (Oral)   Resp 14   Ht 1.753 m (5' 9.02\")   Wt 78 kg (171 lb 15.3 oz)   SpO2 99%   BMI 25.38 kg/m²   Temp (24hrs), Av.7 °F (36.5 °C), Min:97.2 °F (36.2 °C), Max:98 °F (36.7 °C)      Last 24hr Input/Output:    Intake/Output Summary (Last 24 hours) at 2024 0717  Last data filed at 2024 0430  Gross per 24 hour   Intake 600.21 ml   Output 1200 ml   Net -599.79 ml        EKG/Rhythm:       Oxygen: As above    CXR: Xray Result (most recent):  XR CHEST STANDARD TWO VW 2023    Narrative  EXAM: XR CHEST (2 VW)    INDICATION: Abnormal findings on diagnostic imaging of heart and coronary  circulation; Nonrheumatic aortic (valve) stenosis    COMPARISON: 10/11/2022    TECHNIQUE: PA and lateral views of the chest.    FINDINGS:    Lungs are clear.  The cardiomediastinal configuration is within normal limits.  No acute bony abnormalities.    Impression  No acute cardiopulmonary abnormalities.      Echo: 24    ECHO (TTE) COMPLETE (PRN CONTRAST/BUBBLE/STRAIN/3D) 2024  4:20 PM (Final)    Interpretation Summary    Left Ventricle: Normal left ventricular systolic function with a visually estimated EF of 55 - 60%. Left ventricle size is normal. Increased wall thickness. Normal wall motion.    Aortic Valve: Bioprosthetic valve. AV mean gradient is 10

## 2024-01-18 NOTE — PLAN OF CARE
Problem: Chronic Conditions and Co-morbidities  Goal: Patient's chronic conditions and co-morbidity symptoms are monitored and maintained or improved  Outcome: Progressing     Problem: Discharge Planning  Goal: Discharge to home or other facility with appropriate resources  Outcome: Progressing     Problem: Pain  Goal: Verbalizes/displays adequate comfort level or baseline comfort level  Outcome: Progressing

## 2024-01-18 NOTE — DISCHARGE SUMMARY
Cardiology Discharge Summary     [facility]    Patient ID:  Tamia Cruz  [id number]  73 y.o.  1950    Admit Date: 1/17/2024     Discharge Date: 1/18/2024   Admitting Physician: Lv Lott MD   Discharge Physician: Lv Lott MD    Admission and Discharge Diagnoses include:  Non-rheumatic Aortic stenosis    Cardiology Procedures this Admission:  TAVR    Consults:  None    > 30 minutes coordinating discharge.    Hospital Course and Discharge Exam:    Post procedure had mild left femoral bleeding that resolved with hand pressure.  Bilateral arterial ultrasound with no pseudo.      On the day of discharge, ambulatory and taking oral.  All questions answered.  Aware of signs and symptoms warranting urgent medical follow-up or calling 911.    /66   Pulse 69   Temp 97.3 °F (36.3 °C) (Oral)   Resp 16   Ht 1.753 m (5' 9.02\")   Wt 78 kg (171 lb 15.3 oz)   SpO2 96%   BMI 25.38 kg/m²     Physical Exam:  Nondiaphoretic, not in acute distress.  Unlabored, clear to auscultation bilaterally.  Regular rate and rhythm, no murmur, rub, or gallop.  No JVD or peripheral edema.  Palpable radial pulses bilaterally.  Groin site(s) OK.  No cyanosis.  Skin warm and dry.  Awake, appropriate, neuro grossly nonfocal.  Ambulatory.    Lab Results   Component Value Date/Time    WBC 6.7 01/18/2024 04:36 AM    HGB 13.3 01/18/2024 04:36 AM    HCT 41.9 01/18/2024 04:36 AM     01/18/2024 04:36 AM    MCV 94.2 01/18/2024 04:36 AM     Lab Results   Component Value Date/Time     01/18/2024 04:36 AM    K 4.0 01/18/2024 04:36 AM     01/18/2024 04:36 AM    CO2 27 01/18/2024 04:36 AM    BUN 14 01/18/2024 04:36 AM     Lab Results   Component Value Date/Time    BNP 88 10/11/2022 10:29 AM     No results found for: \"TSH\", \"TSH2\", \"TSH3\"  No results found for: \"CHOL\", \"CHOLPOCT\", \"CHOLX\", \"CHLST\", \"CHOLV\", \"HDL\", \"HDLPOC\", \"HDLC\", \"LDL\", \"LDLC\", \"VLDLC\", \"VLDL\", \"TGLX\", \"TRIGL\"  No

## 2024-01-18 NOTE — PROGRESS NOTES
0730: Right and left femoral sites are CDI. Patient denies having chest pain or SOB.   0815: I have reviewed discharge instructions with the patient such as medication changes, post procedure site care instructions, and follow up appointments.     Predictive Model Details          18 (Normal)  Factor Value    Calculated 1/18/2024 08:28 86% Age 73 years old    Deterioration Index Model 8% Systolic 132     4% Platelet count abnormal (136 K/uL)     1% Temperature 97.3 °F (36.3 °C)     1% Hematocrit 41.9 %     1% Sodium 140 mmol/L     0% Pulse 69     0% Potassium 4.0 mmol/L     0% Pulse oximetry 96 %     0% Respiratory rate 16     0% WBC count 6.7 K/uL

## 2024-01-18 NOTE — PLAN OF CARE
Problem: Chronic Conditions and Co-morbidities  Goal: Patient's chronic conditions and co-morbidity symptoms are monitored and maintained or improved  1/18/2024 0745 by Susan Negron, RN  Outcome: Adequate for Discharge  1/17/2024 2217 by Victorina Burton, RN  Outcome: Progressing     Problem: Discharge Planning  Goal: Discharge to home or other facility with appropriate resources  1/18/2024 0745 by Susan Negron, RN  Outcome: Adequate for Discharge  1/17/2024 2217 by Victorina Burton, RN  Outcome: Progressing     Problem: Pain  Goal: Verbalizes/displays adequate comfort level or baseline comfort level  1/18/2024 0745 by Susan Negron RN  Outcome: Adequate for Discharge  1/17/2024 2217 by Victorina Burton, RN  Outcome: Progressing

## 2024-01-18 NOTE — PROGRESS NOTES
Predictive Model Details          20 (Normal)  Factor Value    Calculated 1/17/2024 22:17 72% Age 73 years old    Deterioration Index Model 14% Respiratory rate 18     5% Systolic 124     3% Pulse oximetry 99 %     3% Platelet count abnormal (146 K/uL)     1% Pulse 67     0% WBC count 7.1 K/uL     0% Temperature 97.9 °F (36.6 °C)     0% Hematocrit 39.5 %

## 2024-01-19 LAB
ABO + RH BLD: NORMAL
ANTIGENS PRESENT BLD: NORMAL
ANTIGENS PRESENT RBC DONR: NORMAL
ANTIGENS PRESENT RBC DONR: NORMAL
BLD PROD TYP BPU: NORMAL
BLD PROD TYP BPU: NORMAL
BLOOD BANK DISPENSE STATUS: NORMAL
BLOOD BANK DISPENSE STATUS: NORMAL
BLOOD GROUP ANTIBODIES SERPL: NORMAL
BLOOD GROUP ANTIBODIES SERPL: NORMAL
BPU ID: NORMAL
BPU ID: NORMAL
CROSSMATCH RESULT: NORMAL
CROSSMATCH RESULT: NORMAL
SPECIMEN EXP DATE BLD: NORMAL
UNIT DIVISION: 0
UNIT DIVISION: 0

## 2024-01-24 ENCOUNTER — OFFICE VISIT (OUTPATIENT)
Age: 74
End: 2024-01-24
Payer: MEDICARE

## 2024-01-24 DIAGNOSIS — Z95.2 S/P TAVR (TRANSCATHETER AORTIC VALVE REPLACEMENT): Primary | ICD-10-CM

## 2024-01-24 PROCEDURE — 99441 PR PHYS/QHP TELEPHONE EVALUATION 5-10 MIN: CPT

## 2024-01-26 ENCOUNTER — TELEPHONE (OUTPATIENT)
Age: 74
End: 2024-01-26

## 2024-01-26 NOTE — TELEPHONE ENCOUNTER
Call received from Tamia Cruz who reported that she noted a \"small bump\" (about the size of a marble) in her right groin as she was drying herself after a shower. She said that it is not painful nor is there bruising. She said she has good sensation to her right foot and there is no discoloration to the extremity. She was advised to watch for it growing, she may want to encircle it with a Sharpie and to call back if it get bigger or she begins to have other symptoms of poor circulation. She verbalized understanding.

## 2024-02-22 ENCOUNTER — OFFICE VISIT (OUTPATIENT)
Age: 74
End: 2024-02-22
Payer: MEDICARE

## 2024-02-22 VITALS
RESPIRATION RATE: 16 BRPM | WEIGHT: 171.1 LBS | DIASTOLIC BLOOD PRESSURE: 64 MMHG | OXYGEN SATURATION: 98 % | SYSTOLIC BLOOD PRESSURE: 118 MMHG | TEMPERATURE: 97.8 F | HEART RATE: 63 BPM | BODY MASS INDEX: 25.26 KG/M2

## 2024-02-22 DIAGNOSIS — Z95.2 S/P TAVR (TRANSCATHETER AORTIC VALVE REPLACEMENT): Primary | ICD-10-CM

## 2024-02-22 PROCEDURE — G8484 FLU IMMUNIZE NO ADMIN: HCPCS

## 2024-02-22 PROCEDURE — G8419 CALC BMI OUT NRM PARAM NOF/U: HCPCS

## 2024-02-22 PROCEDURE — 1090F PRES/ABSN URINE INCON ASSESS: CPT

## 2024-02-22 PROCEDURE — G8400 PT W/DXA NO RESULTS DOC: HCPCS

## 2024-02-22 PROCEDURE — 1123F ACP DISCUSS/DSCN MKR DOCD: CPT

## 2024-02-22 PROCEDURE — 99213 OFFICE O/P EST LOW 20 MIN: CPT

## 2024-02-22 PROCEDURE — G8427 DOCREV CUR MEDS BY ELIG CLIN: HCPCS

## 2024-02-22 PROCEDURE — 3017F COLORECTAL CA SCREEN DOC REV: CPT | Performed by: THORACIC SURGERY (CARDIOTHORACIC VASCULAR SURGERY)

## 2024-02-22 PROCEDURE — 1036F TOBACCO NON-USER: CPT | Performed by: THORACIC SURGERY (CARDIOTHORACIC VASCULAR SURGERY)

## 2024-02-22 ASSESSMENT — PATIENT HEALTH QUESTIONNAIRE - PHQ9
SUM OF ALL RESPONSES TO PHQ QUESTIONS 1-9: 0
2. FEELING DOWN, DEPRESSED OR HOPELESS: 0
SUM OF ALL RESPONSES TO PHQ9 QUESTIONS 1 & 2: 0
SUM OF ALL RESPONSES TO PHQ QUESTIONS 1-9: 0
SUM OF ALL RESPONSES TO PHQ QUESTIONS 1-9: 0
1. LITTLE INTEREST OR PLEASURE IN DOING THINGS: 0
SUM OF ALL RESPONSES TO PHQ QUESTIONS 1-9: 0

## 2024-02-22 NOTE — PROGRESS NOTES
Tamia Cruz reported today for the following:    Chief Complaint   Patient presents with    Cardiac Valve Problem    Post-Op Check     1 month post TAVR       Reviewed record in preparation for visit and have obtained necessary documentation. Identified pt with two pt identifiers (name and ).     1. Have you been to the ER, urgent care clinic since your last visit?  Hospitalized since your last visit?No    2. Have you seen or consulted any other health care providers outside of the [facility] since your last visit?  Include any pap smears or colon screening. No    /64   Pulse 63   Temp 97.8 °F (36.6 °C) (Oral)   Resp 16   Wt 77.6 kg (171 lb 1.6 oz)   SpO2 98%   BMI 25.26 kg/m²     Medications reviewed/approved by provider.    No results found for this visit on 24.         Maddie Baptiste RN    
POD1 and have SOB gone. Is not checking blood pressures or blood sugars at home. Plans to rehab on her own. Questions/concerns about activity restrictions answered.       Last Dental Visit:  7/28/23   Any dental pain/concerns: No    NYHA Classification: I   Class I (Mild): No limitation of physical activity. Ordinary physical activity does not cause undue fatigue, palpitation, or dyspnea.   Class II (Mild): Slight limitation of physical activity. Comfortable at rest, but ordinary physical activity results in fatigue, palpitation, or dyspnea.   Class III (Moderate): Marked limitation of physical activity. Comfortable at rest, but less than ordinary activity causes fatigue, palpitation, or dyspnea   Class IV (Severe): Unable to carry out any physical activity without discomfort. Symptoms  of cardiac insufficiency at rest.  If any physical activity is undertaken, discomfort is increased.    Angina Classification: 0   Class 0: No symptoms   Class 1: Angina with strenuous exercise   Class 2: Angina with moderate exercise   Class 3: Angina with mild exertion   Walking 1-2 level blocks at normal pace; Climbing 1 flight of stairs at normal pace  Class 4: Angina at any level of physical exertion       Past Medical History:   Diagnosis Date    Aortic stenosis     Arthritis     Chronic eczema     Diabetes (HCC)     GERD (gastroesophageal reflux disease)     Hypertension     Pancreatic cyst     Rosacea     Thyroid nodule          Past Surgical History:   Procedure Laterality Date    CARDIAC PROCEDURE N/A 12/15/2023    Coronary angiography performed by Lv Lott MD at [facility] CARDIAC CATH LAB    CARDIAC PROCEDURE N/A 12/15/2023    Aortic root aortogram performed by Lv Lott MD at [facility] CARDIAC CATH LAB    CARDIAC PROCEDURE N/A 1/17/2024    Transcatheter aortic valve replacement performed by Lv Lott MD at [facility] CARDIAC CATH LAB    CARDIAC PROCEDURE N/A 1/17/2024    Aortic root aortogram performed by Oren

## 2025-03-05 ENCOUNTER — TELEPHONE (OUTPATIENT)
Age: 75
End: 2025-03-05

## 2025-03-05 NOTE — TELEPHONE ENCOUNTER
Patient called in returning a call that was made to her a few days ago , she stated she did have a surgery about a year ago and wanted to answer more questions you had for her .

## 2025-06-05 RX ORDER — ASPIRIN 81 MG/1
81 TABLET ORAL DAILY
COMMUNITY

## 2025-06-05 RX ORDER — AMLODIPINE BESYLATE 5 MG/1
5 TABLET ORAL DAILY
COMMUNITY
Start: 2024-12-26

## 2025-06-05 NOTE — PERIOP NOTE
[facility]  Ambulatory Surgery Unit  [address] [place] [address]  [address]  Pre-operative Instructions    Surgery/Procedure Date  Tuesday 6/10            Tentative Arrival Time TBD      1. On the day of your surgery/procedure, please report to the Ambulatory Surgery Unit Registration Desk and sign in at your designated time. The Ambulatory Surgery Unit is located in [facility] on the [facility] side of the [facility] across from the [facility]. Please have all of your health insurance cards, co-payment, and a photo ID.    **TWO adults may accompany you the day of the procedure.  We have limited seating available.      2. You cannot be dropped off for surgery.  Please make arrangements for a responsible adult friend or family member to remain on the hospital campus during your procedure, and drive you home, as you should not drive for 24 hours following anesthesia. Make arrangements for a responsible adult to stay with you for at least the first 24 hours after your surgery.    3. Do not have anything to eat or drink (including water, gum, mints, coffee, juice) after 11:59 PM on Monday 6/9 . This may not apply to medications prescribed by your physician.  (Please note below the special instructions with medications to take the morning of surgery, if applicable.)    You can brush your teeth am of surgery    4. We recommend you do not drink any alcoholic beverages for 24 hours before and after your surgery.    5. Contact your surgeon’s office for instructions on the following medications: non-steroidal anti-inflammatory drugs (i.e. Advil, Aleve), vitamins, and supplements. (Some surgeon’s will want you to stop these medications prior to surgery and others may allow you to take them)   **If you are currently taking Plavix, Coumadin, Aspirin and/or other blood-thinning agents, contact your surgeon for instructions.** Your surgeon will partner with the physician prescribing

## 2025-06-06 NOTE — PERIOP NOTE
Spoke to Jeni at Dr. Bragg's office and requested for PCP clearance and Pre-op orders be sent to [facility]. As per Jeni they don't have a Pre-op clearance in their office.     Spoke to patient, stated that her PCP had sent thru email PCP clearance to Dr. Bragg's office.As per patient, she have a copy which she can bring when she picks up her soap here in [facility].

## 2025-06-06 NOTE — PERIOP NOTE
Spoke to Victorina at Dr. BRODIE Mensah's office and requested for office notes, results of CBC, Chem 7  Hgb A1C and EKG be sen to [facility].

## 2025-06-09 ENCOUNTER — ANESTHESIA EVENT (OUTPATIENT)
Facility: HOSPITAL | Age: 75
End: 2025-06-09
Payer: MEDICARE

## 2025-06-09 NOTE — PERIOP NOTE
Spoke to Victorina at Dr. Mensah's office and follow up call re: need for labs and EKG done in their office to be sent here to [facility].    Spoke to person at Dr. Mensah's office, as per same,EKG was not done at PCP office. Lab results obtained from PCP's office for CBC, CMP And HgbA1C  and placed on chart.    Spoke to patient, stated she thought she had an EKG done recently.    Called cardiologist's office and Spoke to Diane at [facility], as pr same, last EKG they have on file was in August ,2024.    1333: Per Monica in Pre-op who spoke with  Dr. Corcoran ( anesthesiologist), EKG can be done on day of surgery.

## 2025-06-10 ENCOUNTER — ANESTHESIA (OUTPATIENT)
Facility: HOSPITAL | Age: 75
End: 2025-06-10
Payer: MEDICARE

## 2025-06-10 ENCOUNTER — HOSPITAL ENCOUNTER (OUTPATIENT)
Facility: HOSPITAL | Age: 75
Setting detail: OUTPATIENT SURGERY
Discharge: HOME | End: 2025-06-10
Attending: ORTHOPAEDIC SURGERY | Admitting: ORTHOPAEDIC SURGERY
Payer: MEDICARE

## 2025-06-10 VITALS
SYSTOLIC BLOOD PRESSURE: 117 MMHG | WEIGHT: 170 LBS | HEIGHT: 69 IN | OXYGEN SATURATION: 98 % | TEMPERATURE: 97.3 F | DIASTOLIC BLOOD PRESSURE: 64 MMHG | RESPIRATION RATE: 17 BRPM | HEART RATE: 66 BPM | BODY MASS INDEX: 25.18 KG/M2

## 2025-06-10 DIAGNOSIS — G89.18 ACUTE POST-OPERATIVE PAIN: Primary | ICD-10-CM

## 2025-06-10 LAB
GLUCOSE BLD STRIP.AUTO-MCNC: 142 MG/DL (ref 65–117)
SERVICE CMNT-IMP: ABNORMAL

## 2025-06-10 PROCEDURE — 6360000002 HC RX W HCPCS

## 2025-06-10 PROCEDURE — 3700000001 HC ADD 15 MINUTES (ANESTHESIA): Performed by: ORTHOPAEDIC SURGERY

## 2025-06-10 PROCEDURE — 6360000002 HC RX W HCPCS: Performed by: ORTHOPAEDIC SURGERY

## 2025-06-10 PROCEDURE — 3700000000 HC ANESTHESIA ATTENDED CARE: Performed by: ORTHOPAEDIC SURGERY

## 2025-06-10 PROCEDURE — 7100000010 HC PHASE II RECOVERY - FIRST 15 MIN: Performed by: ORTHOPAEDIC SURGERY

## 2025-06-10 PROCEDURE — 3600000014 HC SURGERY LEVEL 4 ADDTL 15MIN: Performed by: ORTHOPAEDIC SURGERY

## 2025-06-10 PROCEDURE — 82962 GLUCOSE BLOOD TEST: CPT

## 2025-06-10 PROCEDURE — 2500000003 HC RX 250 WO HCPCS: Performed by: ORTHOPAEDIC SURGERY

## 2025-06-10 PROCEDURE — 2580000003 HC RX 258

## 2025-06-10 PROCEDURE — 7100000001 HC PACU RECOVERY - ADDTL 15 MIN: Performed by: ORTHOPAEDIC SURGERY

## 2025-06-10 PROCEDURE — 2720000010 HC SURG SUPPLY STERILE: Performed by: ORTHOPAEDIC SURGERY

## 2025-06-10 PROCEDURE — 28124 PARTIAL REMOVAL OF TOE: CPT | Performed by: ORTHOPAEDIC SURGERY

## 2025-06-10 PROCEDURE — 2580000003 HC RX 258: Performed by: ANESTHESIOLOGY

## 2025-06-10 PROCEDURE — 2500000003 HC RX 250 WO HCPCS

## 2025-06-10 PROCEDURE — 7100000000 HC PACU RECOVERY - FIRST 15 MIN: Performed by: ORTHOPAEDIC SURGERY

## 2025-06-10 PROCEDURE — 3600000004 HC SURGERY LEVEL 4 BASE: Performed by: ORTHOPAEDIC SURGERY

## 2025-06-10 PROCEDURE — 2709999900 HC NON-CHARGEABLE SUPPLY: Performed by: ORTHOPAEDIC SURGERY

## 2025-06-10 RX ORDER — MEPERIDINE HYDROCHLORIDE 25 MG/ML
12.5 INJECTION INTRAMUSCULAR; INTRAVENOUS; SUBCUTANEOUS EVERY 5 MIN PRN
Status: DISCONTINUED | OUTPATIENT
Start: 2025-06-10 | End: 2025-06-10 | Stop reason: HOSPADM

## 2025-06-10 RX ORDER — SODIUM CHLORIDE 9 MG/ML
INJECTION, SOLUTION INTRAVENOUS PRN
Status: DISCONTINUED | OUTPATIENT
Start: 2025-06-10 | End: 2025-06-10 | Stop reason: HOSPADM

## 2025-06-10 RX ORDER — DROPERIDOL 2.5 MG/ML
0.62 INJECTION, SOLUTION INTRAMUSCULAR; INTRAVENOUS
Status: DISCONTINUED | OUTPATIENT
Start: 2025-06-10 | End: 2025-06-10 | Stop reason: HOSPADM

## 2025-06-10 RX ORDER — WATER 10 ML/10ML
INJECTION INTRAMUSCULAR; INTRAVENOUS; SUBCUTANEOUS
Status: DISCONTINUED
Start: 2025-06-10 | End: 2025-06-10 | Stop reason: HOSPADM

## 2025-06-10 RX ORDER — SODIUM CHLORIDE 0.9 % (FLUSH) 0.9 %
5-40 SYRINGE (ML) INJECTION PRN
Status: DISCONTINUED | OUTPATIENT
Start: 2025-06-10 | End: 2025-06-10 | Stop reason: HOSPADM

## 2025-06-10 RX ORDER — FENTANYL CITRATE 50 UG/ML
25 INJECTION, SOLUTION INTRAMUSCULAR; INTRAVENOUS EVERY 5 MIN PRN
Status: DISCONTINUED | OUTPATIENT
Start: 2025-06-10 | End: 2025-06-10 | Stop reason: HOSPADM

## 2025-06-10 RX ORDER — LIDOCAINE HYDROCHLORIDE 10 MG/ML
1 INJECTION, SOLUTION EPIDURAL; INFILTRATION; INTRACAUDAL; PERINEURAL
Status: DISCONTINUED | OUTPATIENT
Start: 2025-06-10 | End: 2025-06-10 | Stop reason: HOSPADM

## 2025-06-10 RX ORDER — ONDANSETRON 2 MG/ML
INJECTION INTRAMUSCULAR; INTRAVENOUS
Status: DISCONTINUED | OUTPATIENT
Start: 2025-06-10 | End: 2025-06-10 | Stop reason: SDUPTHER

## 2025-06-10 RX ORDER — KETOROLAC TROMETHAMINE 30 MG/ML
15 INJECTION, SOLUTION INTRAMUSCULAR; INTRAVENOUS
Status: DISCONTINUED | OUTPATIENT
Start: 2025-06-10 | End: 2025-06-10 | Stop reason: HOSPADM

## 2025-06-10 RX ORDER — SODIUM CHLORIDE, SODIUM LACTATE, POTASSIUM CHLORIDE, CALCIUM CHLORIDE 600; 310; 30; 20 MG/100ML; MG/100ML; MG/100ML; MG/100ML
INJECTION, SOLUTION INTRAVENOUS CONTINUOUS
Status: DISCONTINUED | OUTPATIENT
Start: 2025-06-10 | End: 2025-06-10 | Stop reason: HOSPADM

## 2025-06-10 RX ORDER — FENTANYL CITRATE 50 UG/ML
INJECTION, SOLUTION INTRAMUSCULAR; INTRAVENOUS
Status: DISCONTINUED | OUTPATIENT
Start: 2025-06-10 | End: 2025-06-10 | Stop reason: SDUPTHER

## 2025-06-10 RX ORDER — OXYCODONE HYDROCHLORIDE 5 MG/1
5 TABLET ORAL EVERY 6 HOURS PRN
Qty: 20 TABLET | Refills: 0 | Status: SHIPPED | OUTPATIENT
Start: 2025-06-10 | End: 2025-06-17

## 2025-06-10 RX ORDER — CEFAZOLIN SODIUM 1 G/3ML
INJECTION, POWDER, FOR SOLUTION INTRAMUSCULAR; INTRAVENOUS
Status: DISCONTINUED
Start: 2025-06-10 | End: 2025-06-10 | Stop reason: HOSPADM

## 2025-06-10 RX ORDER — OXYCODONE HYDROCHLORIDE 5 MG/1
5 TABLET ORAL
Status: DISCONTINUED | OUTPATIENT
Start: 2025-06-10 | End: 2025-06-10 | Stop reason: HOSPADM

## 2025-06-10 RX ORDER — BUPIVACAINE HYDROCHLORIDE 5 MG/ML
INJECTION, SOLUTION PERINEURAL PRN
Status: DISCONTINUED | OUTPATIENT
Start: 2025-06-10 | End: 2025-06-10 | Stop reason: ALTCHOICE

## 2025-06-10 RX ORDER — DEXAMETHASONE SODIUM PHOSPHATE 4 MG/ML
INJECTION, SOLUTION INTRA-ARTICULAR; INTRALESIONAL; INTRAMUSCULAR; INTRAVENOUS; SOFT TISSUE
Status: DISCONTINUED | OUTPATIENT
Start: 2025-06-10 | End: 2025-06-10 | Stop reason: SDUPTHER

## 2025-06-10 RX ORDER — NALOXONE HYDROCHLORIDE 0.4 MG/ML
INJECTION, SOLUTION INTRAMUSCULAR; INTRAVENOUS; SUBCUTANEOUS PRN
Status: DISCONTINUED | OUTPATIENT
Start: 2025-06-10 | End: 2025-06-10 | Stop reason: HOSPADM

## 2025-06-10 RX ORDER — EPHEDRINE SULFATE/0.9% NACL/PF 25 MG/5 ML
SYRINGE (ML) INTRAVENOUS
Status: DISCONTINUED | OUTPATIENT
Start: 2025-06-10 | End: 2025-06-10 | Stop reason: SDUPTHER

## 2025-06-10 RX ORDER — DIPHENHYDRAMINE HYDROCHLORIDE 50 MG/ML
12.5 INJECTION, SOLUTION INTRAMUSCULAR; INTRAVENOUS
Status: DISCONTINUED | OUTPATIENT
Start: 2025-06-10 | End: 2025-06-10 | Stop reason: HOSPADM

## 2025-06-10 RX ORDER — HYDROMORPHONE HYDROCHLORIDE 1 MG/ML
0.5 INJECTION, SOLUTION INTRAMUSCULAR; INTRAVENOUS; SUBCUTANEOUS EVERY 5 MIN PRN
Status: DISCONTINUED | OUTPATIENT
Start: 2025-06-10 | End: 2025-06-10 | Stop reason: HOSPADM

## 2025-06-10 RX ADMIN — WATER 2000 MG: 1 INJECTION INTRAMUSCULAR; INTRAVENOUS; SUBCUTANEOUS at 12:47

## 2025-06-10 RX ADMIN — DEXAMETHASONE SODIUM PHOSPHATE 4 MG: 4 INJECTION, SOLUTION INTRAMUSCULAR; INTRAVENOUS at 12:50

## 2025-06-10 RX ADMIN — FENTANYL CITRATE 50 MCG: 50 INJECTION, SOLUTION INTRAMUSCULAR; INTRAVENOUS at 12:51

## 2025-06-10 RX ADMIN — PROPOFOL 170 MG: 10 INJECTION, EMULSION INTRAVENOUS at 12:35

## 2025-06-10 RX ADMIN — SODIUM CHLORIDE, SODIUM LACTATE, POTASSIUM CHLORIDE, AND CALCIUM CHLORIDE: .6; .31; .03; .02 INJECTION, SOLUTION INTRAVENOUS at 11:44

## 2025-06-10 RX ADMIN — PHENYLEPHRINE HYDROCHLORIDE 120 MCG: 10 INJECTION INTRAVENOUS at 12:44

## 2025-06-10 RX ADMIN — ONDANSETRON HYDROCHLORIDE 4 MG: 2 INJECTION, SOLUTION INTRAMUSCULAR; INTRAVENOUS at 12:50

## 2025-06-10 RX ADMIN — PHENYLEPHRINE HYDROCHLORIDE 30 MCG: 10 INJECTION INTRAVENOUS at 12:45

## 2025-06-10 RX ADMIN — EPHEDRINE SULFATE 10 MG: 5 INJECTION INTRAVENOUS at 13:00

## 2025-06-10 RX ADMIN — PHENYLEPHRINE HYDROCHLORIDE 40 MCG: 10 INJECTION INTRAVENOUS at 12:59

## 2025-06-10 RX ADMIN — FENTANYL CITRATE 50 MCG: 50 INJECTION, SOLUTION INTRAMUSCULAR; INTRAVENOUS at 12:42

## 2025-06-10 ASSESSMENT — PAIN - FUNCTIONAL ASSESSMENT: PAIN_FUNCTIONAL_ASSESSMENT: 0-10

## 2025-06-10 NOTE — BRIEF OP NOTE
Brief Postoperative Note      Patient: Tamia Cruz  YOB: 1950  MRN: [id number]    Date of Procedure: 6/10/2025    Pre-Op Diagnosis Codes:      * Hammer toe of right foot [M20.41]     * Tendonitis of ankle, right [M77.51]     * Toe pain, right [M79.674]     * Callus [L84]    Post-Op Diagnosis: Same       Procedure(s):  RIGHT FOOT BONE SPUR EXCISION (GEN/LOCAL)    Surgeon(s):  Aileen Bragg MD    Assistant:  Surgical Assistant: Ernestina Bray    Anesthesia: General    Estimated Blood Loss (mL): Minimal    Complications: None    Specimens:   * No specimens in log *    Implants:  * No implants in log *      Drains: * No LDAs found *    Findings:  Infection Present At Time Of Surgery (PATOS) (choose all levels that have infection present):  No infection present  Other Findings: prominent bone spur with callus    Electronically signed by Aileen Bragg MD on 6/10/2025 at 1:18 PM

## 2025-06-10 NOTE — PERIOP NOTE
PACU DISCHARGE NOTE    Vital signs stable, pain well controlled, alert and oriented times three or at baseline, follow up per surgeon, no anesthetic complications. Cap refill adequate, post op shoe and ice in place. Instructions reviewed with Pts spouse. Pt d/c with all documented belongings.

## 2025-06-10 NOTE — ANESTHESIA POSTPROCEDURE EVALUATION
Department of Anesthesiology  Postprocedure Note    Patient: Tamia Cruz  MRN: [id number]  YOB: 1950  Date of evaluation: 6/10/2025    Procedure Summary       Date: 06/10/25 Room / Location: [facility]  / [facility] AMBULATORY OR    Anesthesia Start: 1233 Anesthesia Stop: 1324    Procedure: RIGHT FOOT BONE SPUR EXCISION (GEN/LOCAL) (Right: Foot) Diagnosis:       Hammer toe of right foot      Tendonitis of ankle, right      Toe pain, right      Callus      (Hammer toe of right foot [M20.41])      (Tendonitis of ankle, right [M77.51])      (Toe pain, right [M79.674])      (Callus [L84])    Surgeons: Aileen Bragg MD Responsible Provider: Polina Corcoran MD    Anesthesia Type: General ASA Status: 2            Anesthesia Type: General    Reji Phase I: Reji Score: 9    Reji Phase II: Reji Score: 10    Anesthesia Post Evaluation    Patient location during evaluation: PACU  Patient participation: complete - patient participated  Level of consciousness: awake and alert  Pain score: 0  Airway patency: patent  Nausea & Vomiting: no vomiting and no nausea  Cardiovascular status: blood pressure returned to baseline and hemodynamically stable  Respiratory status: acceptable  Hydration status: stable  Multimodal analgesia pain management approach  Pain management: satisfactory to patient    No notable events documented.

## 2025-06-10 NOTE — PERIOP NOTE
Permission received to review discharge instructions and discuss private health information with  and will have someone with them after discharge   Patient states that family/friend will be with them for at least 24 hours following today's procedure.   Air Warming blanket placed on pt; turned on for comfort      No belongings in PACU

## 2025-06-10 NOTE — DISCHARGE INSTRUCTIONS
TAKE NARCOTIC PAIN MEDICATIONS WITH FOOD     Narcotics tend to be constipating, we suggest taking a stool softener such as Colace or Miralax (follow package instructions).    DO NOT DRIVE WHILE TAKING NARCOTIC PAIN MEDICATIONS.    DO NOT TAKE SLEEPING MEDICATIONS OR ANTIANXIETY MEDICATIONS WHILE TAKING NARCOTIC PAIN MEDICATIONS,  ESPECIALLY THE NIGHT OF ANESTHESIA!    CPAP PATIENTS BE SURE TO WEAR MACHINE WHENEVER NAPPING OR SLEEPING!    PATIENT INSTRUCTIONS:    After General Anesthesia or Intravenous Sedation, for 24 hours or while taking prescription Narcotics:        Someone should be with you for the next 24 hours.        For your own safety, a responsible adult must drive you home.  Limit your activities  Recommended activity: Rest today, up with assistance today. Do not climb stairs or shower unattended for the next 24 hours.  Please start with a soft bland diet and advance as tolerated (no nausea) to regular diet.  If you have a sore throat you should try the following: fluids, warm salt water gargles, or throat lozenges. If it does not improve after several days please follow up with your primary physician.  Do not drive and operate hazardous machinery  Do not make important personal or business decisions  Do  not drink alcoholic beverages  If you have not urinated within 8 hours after discharge, please contact your surgeon on call.    Report the following to your surgeon:  Excessive pain, swelling, redness or odor of or around the surgical area  Temperature over 100.5  Nausea and vomiting lasting longer than 4 hours or if unable to take medications  Any signs of decreased circulation or nerve impairment to extremity: change in color, persistent  numbness, tingling, coldness or increase pain      You will receive a Post Operative Call from one of the Recovery Room Nurses on the day after your surgery to check on you. It is very important for us to know how you are recovering after your surgery. If you have

## 2025-06-10 NOTE — ANESTHESIA PRE PROCEDURE
01/18/2024 04:36 AM    RDW 12.8 01/18/2024 04:36 AM     01/18/2024 04:36 AM       CMP:   Lab Results   Component Value Date/Time     01/18/2024 04:36 AM    K 4.0 01/18/2024 04:36 AM     01/18/2024 04:36 AM    CO2 27 01/18/2024 04:36 AM    BUN 14 01/18/2024 04:36 AM    CREATININE 0.75 01/18/2024 04:36 AM    AGRATIO 1.3 10/11/2022 10:29 AM    LABGLOM >60 01/18/2024 04:36 AM    LABGLOM >60 10/11/2022 10:29 AM    GLUCOSE 139 01/18/2024 04:36 AM    CALCIUM 8.4 01/18/2024 04:36 AM    BILITOT 0.6 01/18/2024 04:36 AM    ALKPHOS 51 01/18/2024 04:36 AM    ALKPHOS 62 10/11/2022 10:29 AM    AST 22 01/18/2024 04:36 AM    ALT 27 01/18/2024 04:36 AM       POC Tests:   No results for input(s): \"POCGLU\", \"POCNA\", \"POCK\", \"POCCL\", \"POCBUN\", \"POCHEMO\", \"POCHCT\" in the last 72 hours.      Coags:   Lab Results   Component Value Date/Time    PROTIME 10.9 01/11/2024 01:12 PM    INR 1.0 01/11/2024 01:12 PM       HCG (If Applicable): No results found for: \"PREGTESTUR\", \"PREGSERUM\", \"HCG\", \"HCGQUANT\"     ABGs: No results found for: \"PHART\", \"PO2ART\", \"MMR3PYQ\", \"VKZ6HCB\", \"BEART\", \"Y8XWCHKB\"     Type & Screen (If Applicable):  No results found for: \"LABABO\"    Drug/Infectious Status (If Applicable):  No results found for: \"HIV\", \"HEPCAB\"    COVID-19 Screening (If Applicable):   Lab Results   Component Value Date/Time    COVID19 Not detected 01/11/2024 01:12 PM           Anesthesia Evaluation  Patient summary reviewed and Nursing notes reviewed   no history of anesthetic complications:   Airway: Mallampati: III  TM distance: >3 FB   Neck ROM: full  Mouth opening: > = 3 FB   Dental: normal exam   (+) caps      Pulmonary:Negative Pulmonary ROS and normal exam  breath sounds clear to auscultation                             Cardiovascular:  Exercise tolerance: good (>4 METS)  (+) hypertension:, valvular problems/murmurs: AS, hyperlipidemia      ECG reviewed  Rhythm: regular  Rate: normal  Echocardiogram reviewed    Cleared by

## 2025-06-10 NOTE — OP NOTE
[facility]              [address] [place]  [address]                            OPERATIVE REPORT      PATIENT NAME: EDGARDO PRADHAN          : 1950  MED REC NO: [id number]                       ROOM: ASU  ACCOUNT NO: [id number]                       ADMIT DATE: 06/10/2025  PROVIDER: Aileen Bragg MD    DATE OF SERVICE:  06/10/2025    PREOPERATIVE DIAGNOSES:  Right fifth toe painful hammertoe with preulcer callus.    POSTOPERATIVE DIAGNOSES:  Right fifth toe painful hammertoe with preulcer callus.    PROCEDURES PERFORMED:  Right foot bone spur excision with hammertoe correction without pinning.    SURGEON:  Aileen Bragg MD    ASSISTANT:  ***    ANESTHESIA:  General endotracheal and local pain block using 10 mL of 0.25% Marcaine.    ESTIMATED BLOOD LOSS:  Less than 10 mL.    SPECIMENS REMOVED:  ***    INTRAOPERATIVE FINDINGS:  ***     COMPLICATIONS:  None.    IMPLANTS:  None.    INDICATIONS:  The patient has a preulcerative callus ulcer at the dorsal lateral aspect of the fifth toe where she has a prominent bone spur and also a little bit of the hammertoe there.  A length discussion had about treatments as she has failed conservative treatment.  We discussed a hammertoe procedure, which may require pinning.  After further discussion, shared medical decision making process completed, she elected to proceed only with the bone spur excision and doing kind of correction without K-wire fixation.  I do not want to be limited by K-wire fixation.  So, the decision was made to do the procedure without pinning.  The patient verbalized understanding of all discussed, which included the surgical plan, risks, potential complications, expected outcomes, postoperative limitation time needed for recovery.  All questions answered.  No guarantees were made and informed consent obtained.    DESCRIPTION OF PROCEDURE:  The patient was identified in the preop holding area

## (undated) DEVICE — KIT ACCS INTRO 4FR L10CM NDL 21GA L7CM GWIRE L40CM

## (undated) DEVICE — SYRINGE ANGIO 10 CC BRL STD PRNT POLYCARB LT BLU MEDALLION

## (undated) DEVICE — BASIN EMSIS 16OZ GRAPHITE PLAS KID SHP MOLD GRAD FOR ORAL

## (undated) DEVICE — TUBING SUCT 10FR MAL ALUM SHFT FN CAP VENT UNIV CONN W/ OBT

## (undated) DEVICE — SUTURE VICRYL + SZ 3-0 L27IN ABSRB UD L26MM SH 1/2 CIR VCP416H

## (undated) DEVICE — GUIDEWIRE ANGIO L150CM OD0.035IN STR FIX PTFE SLD SUPP

## (undated) DEVICE — HYPODERMIC SAFETY NEEDLE: Brand: MAGELLAN

## (undated) DEVICE — Device

## (undated) DEVICE — PINNACLE INTRODUCER SHEATH: Brand: PINNACLE

## (undated) DEVICE — GUIDEWIRE VASC L145CM 0.035IN J TIP L3MM PTFE FIX COR NAMIC

## (undated) DEVICE — GOWN,SIRUS,FABRNF,XL,20/CS: Brand: MEDLINE

## (undated) DEVICE — SOLIDIFIER FLD 2OZ 1500CC N DISINF IN BTL DISP SAFESORB

## (undated) DEVICE — BANDAGE COMPR M W6INXL10YD WHT BGE VELC E MTRX HK AND LOOP

## (undated) DEVICE — EXTREMITY-MRMC: Brand: MEDLINE INDUSTRIES, INC.

## (undated) DEVICE — 3M™ IOBAN™ 2 ANTIMICROBIAL INCISE DRAPE 6650EZ: Brand: IOBAN™ 2

## (undated) DEVICE — ANGIOGRAPHIC CATHETER: Brand: IMPULSE™

## (undated) DEVICE — FLOW DIRECTED PACING CATHETER
Type: IMPLANTABLE DEVICE | Status: NON-FUNCTIONAL
Brand: PACEL™
Removed: 2024-01-17

## (undated) DEVICE — CATH IV AUTOGRD BC PNK 20GA 25 -- INSYTE

## (undated) DEVICE — GUIDEWIRE VASC L260CM 0.035IN J TIP L3MM PTFE FIX COR NAMIC

## (undated) DEVICE — PENCIL ES L3M BTTN SWCH S STL HEX LOK BLDE ELECTRD HOLSTER

## (undated) DEVICE — DRESSING,GAUZE,XEROFORM,CURAD,5"X9",ST: Brand: CURAD

## (undated) DEVICE — BANDAGE COBAN 4 IN COMPR W4INXL5YD FOAM COHESIVE QUIK STK SELF ADH SFT

## (undated) DEVICE — SOLUTION IRRIG 1000ML 09% SOD CHL USP PIC PLAS CONTAINER

## (undated) DEVICE — DRAPE C ARM W/ POLY STRP W42XL72IN FOR MOB XR

## (undated) DEVICE — DRAPE EQUIP CARM MINI 85X54 IN W/ELASTIC OPENING

## (undated) DEVICE — PADDING CAST W6INXL4YD ST COT RAYON MICROPLEATED HIGHLY

## (undated) DEVICE — Z DISCONTINUED PER MEDLINE LINE GAS SAMPLING O2/CO2 LNG AD 13 FT NSL W/ TBNG FILTERLINE

## (undated) DEVICE — SYR 10ML LUER LOK 1/5ML GRAD --

## (undated) DEVICE — DRESSING HEMOSTATIC SFT INTVENT W/O SLT DBL WRP QUIKCLOT LF

## (undated) DEVICE — PERCLOSE™ PROSTYLE™ SUTURE-MEDIATED CLOSURE AND REPAIR SYSTEM: Brand: PERCLOSE™ PROSTYLE™

## (undated) DEVICE — BLADE SAW W5.5XL11.5MM THK0.38MM CUT THK0.43MM REPL S STL

## (undated) DEVICE — NEONATAL-ADULT SPO2 SENSOR: Brand: NELLCOR

## (undated) DEVICE — GLOVE SURG SZ 7 L12IN FNGR THK79MIL GRN LTX FREE

## (undated) DEVICE — SUTURE VICRYL SZ 2-0 L27IN ABSRB UD L26MM SH 1/2 CIR J417H

## (undated) DEVICE — PENCIL SMK EVAC ALL IN 1 DSGN ENH VISIBILITY IMPROVED AIR

## (undated) DEVICE — 3M™ TEGADERM™ TRANSPARENT FILM DRESSING FRAME STYLE, 1626W, 4 IN X 4-3/4 IN (10 CM X 12 CM), 50/CT 4CT/CASE: Brand: 3M™ TEGADERM™

## (undated) DEVICE — SC 3W HP RA OFF NB - PG: Brand: NAMIC

## (undated) DEVICE — PAD,ABDOMINAL,5"X9",ST,LF,25/BX: Brand: MEDLINE INDUSTRIES, INC.

## (undated) DEVICE — COVER LT HNDL PLAS RIG 1 PER PK

## (undated) DEVICE — 1200 GUARD II KIT W/5MM TUBE W/O VAC TUBE: Brand: GUARDIAN

## (undated) DEVICE — SET ADMIN 16ML TBNG L100IN 2 Y INJ SITE IV PIGGY BK DISP

## (undated) DEVICE — ASTOUND STANDARD SURGICAL GOWN, XXL: Brand: CONVERTORS

## (undated) DEVICE — BLADE,CARBON-STEEL,15,STRL,DISPOSABLE,TB: Brand: MEDLINE

## (undated) DEVICE — PRECISION THIN (9.0 X 0.38 X 31.0MM)

## (undated) DEVICE — ELECTRODE,RADIOTRANSLUCENT,FOAM,5PK: Brand: MEDLINE

## (undated) DEVICE — GLOVE SURG SZ 65 THK91MIL LTX FREE SYN POLYISOPRENE

## (undated) DEVICE — SUTURE MONOCRYL SZ 3-0 L27IN ABSRB UD L19MM PS-2 3/8 CIR PRIM Y427H

## (undated) DEVICE — ZIMMER® STERILE DISPOSABLE TOURNIQUET CUFF WITH PLC, DUAL PORT, SINGLE BLADDER, 34 IN. (86 CM)

## (undated) DEVICE — SPONGE GZ W4XL4IN COT 12 PLY TYP VII WVN C FLD DSGN STERILE

## (undated) DEVICE — TOWEL 4 PLY TISS 19X30 SUE WHT

## (undated) DEVICE — PROVE COVER: Brand: UNBRANDED

## (undated) DEVICE — SUTURE ETHILON SZ 3-0 L18IN NONABSORBABLE BLK L24MM PS-1 3/8 1663G

## (undated) DEVICE — SYR 3ML LL TIP 1/10ML GRAD --

## (undated) DEVICE — DRESSING,GAUZE,XEROFORM,CURAD,1"X8",ST: Brand: CURAD

## (undated) DEVICE — BANDAGE,GAUZE,BULKEE II,4.5"X4.1YD,STRL: Brand: MEDLINE

## (undated) DEVICE — AMPLATZ EXTRA STIFF WIRE GUIDE: Brand: AMPLATZ

## (undated) DEVICE — SYRINGE KIT,PACKAGED,,150FT,MK 7(ANGIO-ARTERION, 150ML SYR KIT W/QFT,MC)(60729385): Brand: MEDRAD® MARK 7 ARTERION DISPOSABLE SYRINGE 150 ML WITH QUICK FILL TUBE

## (undated) DEVICE — PADDING CAST 4 INX5 YD STRL

## (undated) DEVICE — BOWL MED L 32OZ PLAS W/ MOLD GRAD EZ OPN PEEL PCH

## (undated) DEVICE — CATHETER DIAG 5FR L100CM LUMN ID0.047IN JR4 CRV 0 SIDE H